# Patient Record
Sex: FEMALE | Race: WHITE | NOT HISPANIC OR LATINO | Employment: UNEMPLOYED | URBAN - METROPOLITAN AREA
[De-identification: names, ages, dates, MRNs, and addresses within clinical notes are randomized per-mention and may not be internally consistent; named-entity substitution may affect disease eponyms.]

---

## 2017-04-24 ENCOUNTER — HISTORICAL (OUTPATIENT)
Dept: LAB | Facility: HOSPITAL | Age: 47
End: 2017-04-24

## 2018-01-23 ENCOUNTER — HISTORICAL (OUTPATIENT)
Dept: RADIOLOGY | Facility: HOSPITAL | Age: 48
End: 2018-01-23

## 2018-01-31 ENCOUNTER — HISTORICAL (OUTPATIENT)
Dept: RADIOLOGY | Facility: HOSPITAL | Age: 48
End: 2018-01-31

## 2018-01-31 LAB — POC CREATININE: 0.9 MG/DL (ref 0.6–1.3)

## 2018-09-05 ENCOUNTER — HISTORICAL (OUTPATIENT)
Dept: LAB | Facility: HOSPITAL | Age: 48
End: 2018-09-05

## 2018-09-05 LAB
APTT PPP: 23.1 SECOND(S) (ref 21–30)
BUN SERPL-MCNC: 10 MG/DL (ref 7–18)
CALCIUM SERPL-MCNC: 8.8 MG/DL (ref 8.5–10.1)
CHLORIDE SERPL-SCNC: 103 MMOL/L (ref 98–107)
CO2 SERPL-SCNC: 33 MMOL/L (ref 21–32)
CREAT SERPL-MCNC: 0.95 MG/DL (ref 0.55–1.02)
CREAT/UREA NIT SERPL: 11
GLUCOSE SERPL-MCNC: 117 MG/DL (ref 74–106)
HCT VFR BLD AUTO: 40.2 % (ref 37–47)
HGB BLD-MCNC: 13.4 GM/DL (ref 12–16)
INR PPP: 1 (ref 2–3)
PLATELET # BLD AUTO: 256 X10(3)/MCL (ref 130–400)
POTASSIUM SERPL-SCNC: 4.3 MMOL/L (ref 3.5–5.1)
PROTHROMBIN TIME: 10.1 SECOND(S) (ref 9.3–11.4)
SODIUM SERPL-SCNC: 142 MMOL/L (ref 136–145)

## 2018-09-21 ENCOUNTER — HISTORICAL (OUTPATIENT)
Dept: ADMINISTRATIVE | Facility: HOSPITAL | Age: 48
End: 2018-09-21

## 2018-09-21 LAB — GROUP & RH: NORMAL

## 2019-02-15 ENCOUNTER — HISTORICAL (OUTPATIENT)
Dept: RADIOLOGY | Facility: HOSPITAL | Age: 49
End: 2019-02-15

## 2019-12-17 ENCOUNTER — HISTORICAL (OUTPATIENT)
Dept: RADIOLOGY | Facility: HOSPITAL | Age: 49
End: 2019-12-17

## 2020-02-13 ENCOUNTER — HISTORICAL (OUTPATIENT)
Dept: RADIOLOGY | Facility: HOSPITAL | Age: 50
End: 2020-02-13

## 2020-02-19 LAB
BUN SERPL-MCNC: 9 MG/DL (ref 7–18)
CALCIUM SERPL-MCNC: 9.3 MG/DL (ref 8.5–10.1)
CHLORIDE SERPL-SCNC: 101 MMOL/L (ref 98–107)
CO2 SERPL-SCNC: 30 MMOL/L (ref 21–32)
CREAT SERPL-MCNC: 0.86 MG/DL (ref 0.55–1.02)
CREAT/UREA NIT SERPL: 10
EST. AVERAGE GLUCOSE BLD GHB EST-MCNC: 157 MG/DL
GLUCOSE SERPL-MCNC: 143 MG/DL (ref 74–106)
HBA1C MFR BLD: 7.1 % (ref 4.2–6.3)
POTASSIUM SERPL-SCNC: 3.9 MMOL/L (ref 3.5–5.1)
SODIUM SERPL-SCNC: 137 MMOL/L (ref 136–145)

## 2020-03-02 ENCOUNTER — HISTORICAL (OUTPATIENT)
Dept: SURGERY | Facility: HOSPITAL | Age: 50
End: 2020-03-02

## 2021-01-08 ENCOUNTER — HISTORICAL (OUTPATIENT)
Dept: RADIOLOGY | Facility: HOSPITAL | Age: 51
End: 2021-01-08

## 2021-07-31 ENCOUNTER — HISTORICAL (OUTPATIENT)
Dept: INFECTIOUS DISEASES | Facility: HOSPITAL | Age: 51
End: 2021-07-31

## 2022-04-11 ENCOUNTER — HISTORICAL (OUTPATIENT)
Dept: ADMINISTRATIVE | Facility: HOSPITAL | Age: 52
End: 2022-04-11
Payer: COMMERCIAL

## 2022-04-24 VITALS
SYSTOLIC BLOOD PRESSURE: 148 MMHG | DIASTOLIC BLOOD PRESSURE: 103 MMHG | BODY MASS INDEX: 43.34 KG/M2 | WEIGHT: 260.13 LBS | HEIGHT: 65 IN

## 2022-04-28 ENCOUNTER — HISTORICAL (OUTPATIENT)
Dept: SURGERY | Facility: HOSPITAL | Age: 52
End: 2022-04-28
Payer: COMMERCIAL

## 2022-04-29 ENCOUNTER — HISTORICAL (OUTPATIENT)
Dept: SURGERY | Facility: HOSPITAL | Age: 52
End: 2022-04-29
Payer: COMMERCIAL

## 2022-04-29 LAB
ABS NEUT (OLG): 5.7 (ref 1.5–6.9)
ALBUMIN SERPL-MCNC: 3.5 G/DL (ref 3.5–5)
ALBUMIN/GLOB SERPL: 0.9 {RATIO} (ref 1.1–2)
ALP SERPL-CCNC: 122 U/L (ref 40–150)
ALT SERPL-CCNC: 24 U/L (ref 0–55)
APPEARANCE, UA: CLEAR
APTT PPP: 26 S (ref 23.4–34.9)
AST SERPL-CCNC: 21 U/L (ref 5–34)
BASOPHILS # BLD AUTO: 0 10*3/UL (ref 0–0.1)
BASOPHILS NFR BLD AUTO: 0 % (ref 0–1)
BILIRUB SERPL-MCNC: 0.3 MG/DL
BILIRUB UR QL STRIP: NEGATIVE
BILIRUBIN DIRECT+TOT PNL SERPL-MCNC: 0.1 (ref 0–0.5)
BILIRUBIN DIRECT+TOT PNL SERPL-MCNC: 0.2 (ref 0–0.8)
BUN SERPL-MCNC: 14 MG/DL (ref 9.8–20.1)
CALCIUM SERPL-MCNC: 9.7 MG/DL (ref 8.7–10.5)
CHLORIDE SERPL-SCNC: 99 MMOL/L (ref 98–107)
CO2 SERPL-SCNC: 31 MMOL/L (ref 22–29)
COLOR UR: YELLOW
CREAT SERPL-MCNC: 0.84 MG/DL (ref 0.55–1.02)
DO MICRO?: NO
EOSINOPHIL # BLD AUTO: 0.2 10*3/UL (ref 0–0.6)
EOSINOPHIL NFR BLD AUTO: 2 % (ref 0–5)
ERYTHROCYTE [DISTWIDTH] IN BLOOD BY AUTOMATED COUNT: 12.4 % (ref 11.5–17)
GLOBULIN SER-MCNC: 3.7 G/DL (ref 2.4–3.5)
GLUCOSE (UA): >=1000
GLUCOSE SERPL-MCNC: 226 MG/DL (ref 74–100)
HCT VFR BLD AUTO: 45.9 % (ref 36–48)
HEMOLYSIS INTERF INDEX SERPL-ACNC: 1
HGB BLD-MCNC: 15.5 G/DL (ref 12–16)
HGB UR QL STRIP: NEGATIVE
ICTERIC INTERF INDEX SERPL-ACNC: 0
IMM GRANULOCYTES # BLD AUTO: 0.03 10*3/UL (ref 0–0.02)
IMM GRANULOCYTES NFR BLD AUTO: 0.3 % (ref 0–0.43)
INR PPP: 1 (ref 2–3)
KETONES UR QL STRIP: NEGATIVE
LEUKOCYTE ESTERASE UR QL STRIP: NEGATIVE
LIPEMIC INTERF INDEX SERPL-ACNC: 0
LYMPHOCYTES # BLD AUTO: 2.4 10*3/UL (ref 0.5–4.1)
LYMPHOCYTES NFR BLD AUTO: 27 % (ref 15–40)
MANUAL DIFF? (OHS): NO
MCH RBC QN AUTO: 31 PG (ref 27–34)
MCHC RBC AUTO-ENTMCNC: 34 G/DL (ref 31–36)
MCV RBC AUTO: 93 FL (ref 80–99)
MONOCYTES # BLD AUTO: 0.4 10*3/UL (ref 0–1.1)
MONOCYTES NFR BLD AUTO: 5 % (ref 4–12)
NEUTROPHILS # BLD AUTO: 5.7 10*3/UL (ref 1.5–6.9)
NEUTROPHILS NFR BLD AUTO: 65 % (ref 43–75)
NITRITE UR QL STRIP: NEGATIVE
PH UR STRIP: 6 [PH] (ref 4.6–8)
PLATELET # BLD AUTO: 271 10*3/UL (ref 140–400)
PMV BLD AUTO: 10 FL (ref 6.8–10)
POTASSIUM SERPL-SCNC: 3.8 MMOL/L (ref 3.5–5.1)
PROT SERPL-MCNC: 7.2 G/DL (ref 6.4–8.3)
PROT UR QL STRIP: NEGATIVE
PROTHROMBIN TIME: 12.9 S (ref 11.7–14.5)
RBC # BLD AUTO: 4.94 10*6/UL (ref 4.2–5.4)
SODIUM SERPL-SCNC: 140 MMOL/L (ref 136–145)
SP GR UR STRIP: 1.01 (ref 1–1.03)
UROBILINOGEN UR STRIP-ACNC: 0.2
WBC # SPEC AUTO: 8.8 10*3/UL (ref 4.5–11.5)

## 2022-04-29 RX ORDER — LISINOPRIL 20 MG/1
20 TABLET ORAL EVERY MORNING
COMMUNITY

## 2022-04-29 RX ORDER — ERGOCALCIFEROL 1.25 MG/1
50000 CAPSULE ORAL
COMMUNITY

## 2022-04-29 RX ORDER — METFORMIN HYDROCHLORIDE 500 MG/1
500 TABLET ORAL 2 TIMES DAILY
COMMUNITY

## 2022-04-29 RX ORDER — LISDEXAMFETAMINE DIMESYLATE 40 MG/1
40 CAPSULE ORAL EVERY MORNING
COMMUNITY

## 2022-04-29 RX ORDER — DULOXETIN HYDROCHLORIDE 60 MG/1
60 CAPSULE, DELAYED RELEASE ORAL 2 TIMES DAILY
COMMUNITY

## 2022-04-29 RX ORDER — ICOSAPENT ETHYL 1000 MG/1
2 CAPSULE ORAL 2 TIMES DAILY
COMMUNITY

## 2022-04-29 RX ORDER — AMITRIPTYLINE HYDROCHLORIDE 75 MG/1
100 TABLET ORAL NIGHTLY
COMMUNITY

## 2022-04-29 RX ORDER — METHOCARBAMOL 750 MG/1
750 TABLET, FILM COATED ORAL 3 TIMES DAILY
COMMUNITY

## 2022-04-29 RX ORDER — EMPAGLIFLOZIN 25 MG/1
25 TABLET, FILM COATED ORAL EVERY MORNING
COMMUNITY

## 2022-04-29 RX ORDER — FUROSEMIDE 40 MG/1
40 TABLET ORAL EVERY MORNING
COMMUNITY

## 2022-04-29 RX ORDER — PROGESTERONE 100 MG/1
100 CAPSULE ORAL NIGHTLY
COMMUNITY

## 2022-04-29 RX ORDER — SPIRONOLACTONE 25 MG/1
25 TABLET ORAL EVERY MORNING
COMMUNITY

## 2022-04-29 RX ORDER — ALBUTEROL SULFATE 90 UG/1
2 AEROSOL, METERED RESPIRATORY (INHALATION) EVERY 4 HOURS PRN
COMMUNITY

## 2022-04-29 RX ORDER — ISOSORBIDE DINITRATE 30 MG/1
20 TABLET ORAL 3 TIMES DAILY
COMMUNITY

## 2022-04-29 RX ORDER — GABAPENTIN 600 MG/1
600 TABLET ORAL 3 TIMES DAILY
COMMUNITY

## 2022-04-29 RX ORDER — CARIPRAZINE 3 MG/1
3 CAPSULE, GELATIN COATED ORAL EVERY MORNING
COMMUNITY

## 2022-04-29 RX ORDER — OXYCODONE HYDROCHLORIDE 15 MG/1
15 TABLET ORAL 4 TIMES DAILY
COMMUNITY

## 2022-04-29 RX ORDER — ATORVASTATIN CALCIUM 10 MG/1
10 TABLET, FILM COATED ORAL EVERY MORNING
COMMUNITY

## 2022-04-29 RX ORDER — ISOSORBIDE MONONITRATE 30 MG/1
30 TABLET, EXTENDED RELEASE ORAL EVERY MORNING
COMMUNITY

## 2022-04-29 RX ORDER — POTASSIUM CHLORIDE 750 MG/1
10 CAPSULE, EXTENDED RELEASE ORAL EVERY MORNING
COMMUNITY

## 2022-04-29 RX ORDER — DULAGLUTIDE 1.5 MG/.5ML
1.5 INJECTION, SOLUTION SUBCUTANEOUS
COMMUNITY

## 2022-04-30 NOTE — OP NOTE
DATE OF SURGERY:    03/02/2020    SURGEON:  JAYNA Bates MD    PREOPERATIVE DIAGNOSIS:  Right posterior calcaneal osteophyte with Achilles tendinitis and peroneus brevis tendinitis with subluxation.    POSTOPERATIVE DIAGNOSIS:  Right posterior calcaneal osteophyte with Achilles tendinitis and peroneus brevis tendinitis with subluxation.    OPERATIVE PROCEDURES:    1. Excision of osteophyte right calcaneus with reattachment of Achilles tendon.  2. Exploration and repair of the peroneus brevis tendon with removal of excess muscle belly and correction of peroneal tendon subluxation.    INDICATION FOR OPERATION:  This 49-year-old had the above-mentioned problems.  She had failed conservative therapy and desired operative intervention.  The risks and benefits of the proposed and alternative treatment were explained to the patient.  Questions were solicited and answered. No assurance given. Informed consent was obtained.    OPERATION IN DETAIL:  After appropriate operative consents were obtained, the patient was taken to the operating room.  General endotracheal anesthesia was induced without difficulty.  The patient was rolled in a left lateral decubitus position on a bean bag.  Axillary roll was placed and the bean bag was inflated.  The patient was secured and all pressure points were padded.  The skin on the right leg was prepped and draped in a sterile manner using ChloraPrep.  After exsanguination with an Esmarch bandage, the previously placed thigh tourniquet was elevated.     A standard posterior approach to the Achilles tendon was done.  Careful dissection was done.  Tendon was identified and retractions were placed.  C-arm was brought in.  Osteophyte was identified.  The Achilles tendon was split along the small portion of its length and a small amount of Achilles tendon was taken off the calcaneal osteophyte, and this was removed with an osteotome.  This entire process was watched on AP and  lateral plane on image intensification.  Once I was satisfied with my resection, the wound was thoroughly irrigated.  Using a 2.9 mm JuggerKnot, a drill hole was made in the calcaneus.  The JuggerKnot was inserted and secured, and the Achilles tendon was secured back to the calcaneus.  Interrupted MaxBraid sutures were used to complete the repair.  The wound was thoroughly irrigated.  All bleeders were coagulated and the wound was repaired in a layered interrupted fashion with nylon sutures on the skin.     Next, attention was turned to the peroneal tendons.  A separate incision was made over the peroneal tendon sheath and this was opened.  The sural nerve was identified and protected.  The peroneal brevis tendon was found to sublux, had an accessory muscle belly, and was split in three pieces.  After I had cleared the excess muscle belly, I used Prolene suture to repair the peroneus brevis tendon in a tubularization-type fashion.  Once I was comfortable with my repair, the foot was taken through a range of motion.  It was found that more muscle belly needed to be removed, but at this point the peroneus brevis tendon became stable.  The sheath was repaired with interrupted Vicryl sutures and then the wound was thoroughly irrigated.  All bleeders were coagulated.  The wound was closed in a layered interrupted fashion with nylon sutures on the skin.  Sterile dressings were applied.  A compressive dressing was applied.  The tourniquet was deflated.  An extremely well-padded short-leg posterior splint was applied.     Lap count and sponge count were correct x2.  Estimated blood loss was 3 cc.  The patient tolerated the procedure without difficulty and was transferred to the recovery room in stable condition.        ______________________________  MTricia Bates MD    [facility]/  DD:  03/02/2020  Time:  09:24AM  DT:  03/02/2020  Time:  09:36AM  Job #:  [id number]

## 2022-05-06 ENCOUNTER — ANESTHESIA EVENT (OUTPATIENT)
Dept: SURGERY | Facility: HOSPITAL | Age: 52
End: 2022-05-06
Payer: COMMERCIAL

## 2022-05-07 NOTE — ANESTHESIA PREPROCEDURE EVALUATION
05/07/2022  Kathi Cleary is a 51 y.o., female.      Pre-op Assessment    I have reviewed the Patient Summary Reports.     I have reviewed the Nursing Notes. I have reviewed the NPO Status.      Review of Systems  Anesthesia Hx:  No problems with previous Anesthesia Denies Hx of Anesthetic complications    Social:  Non-Smoker, No Alcohol Use    Hematology/Oncology:  Hematology Normal   Oncology Normal     EENT/Dental:EENT/Dental Normal   Cardiovascular:   Hypertension    Pulmonary:  Pulmonary Normal    Renal/:  Renal/ Normal     Hepatic/GI:  Hepatic/GI Normal    Musculoskeletal:  Musculoskeletal Normal    Neurological:  Neurology Normal    Endocrine:   Diabetes    Dermatological:  Skin Normal    Psych:   Psychiatric History          Physical Exam  General: Cooperative, Alert and Oriented    Airway:  Mouth Opening: Normal  TM Distance: Normal  Tongue: Normal  Neck ROM: Normal ROM    Dental:  Intact        Anesthesia Plan  Type of Anesthesia, risks & benefits discussed:    Anesthesia Type: Gen Natural Airway  Intra-op Monitoring Plan: Standard ASA Monitors  Post Op Pain Control Plan:   (medical reason for not using multimodal pain management)  Informed Consent: Informed consent signed with the Patient and all parties understand the risks and agree with anesthesia plan.  All questions answered. Patient consented to blood products? Yes  ASA Score: 3  Anesthesia Plan Notes: 52 yo F sched for Colonoscopy  ASA III: HTN, DMII  Plan : IV GA    Ready For Surgery From Anesthesia Perspective.     .

## 2022-05-08 RX ORDER — DEXTROSE MONOHYDRATE AND SODIUM CHLORIDE 5; .45 G/100ML; G/100ML
INJECTION, SOLUTION INTRAVENOUS CONTINUOUS
Status: CANCELLED | OUTPATIENT
Start: 2022-05-08

## 2022-05-09 ENCOUNTER — ANESTHESIA (OUTPATIENT)
Dept: SURGERY | Facility: HOSPITAL | Age: 52
End: 2022-05-09
Payer: COMMERCIAL

## 2022-05-09 ENCOUNTER — HOSPITAL ENCOUNTER (OUTPATIENT)
Facility: HOSPITAL | Age: 52
Discharge: HOME | End: 2022-05-09
Attending: SURGERY | Admitting: SURGERY
Payer: COMMERCIAL

## 2022-05-09 VITALS
HEIGHT: 65 IN | BODY MASS INDEX: 43.32 KG/M2 | TEMPERATURE: 96 F | RESPIRATION RATE: 18 BRPM | SYSTOLIC BLOOD PRESSURE: 99 MMHG | DIASTOLIC BLOOD PRESSURE: 64 MMHG | WEIGHT: 260 LBS | HEART RATE: 92 BPM | OXYGEN SATURATION: 95 %

## 2022-05-09 DIAGNOSIS — Z12.11 COLON CANCER SCREENING: ICD-10-CM

## 2022-05-09 DIAGNOSIS — Z12.11 ENCOUNTER FOR COLORECTAL CANCER SCREENING: Primary | ICD-10-CM

## 2022-05-09 DIAGNOSIS — Z12.12 ENCOUNTER FOR COLORECTAL CANCER SCREENING: Primary | ICD-10-CM

## 2022-05-09 PROCEDURE — 37000008 HC ANESTHESIA 1ST 15 MINUTES: Performed by: SURGERY

## 2022-05-09 PROCEDURE — 63600175 PHARM REV CODE 636 W HCPCS: Performed by: NURSE ANESTHETIST, CERTIFIED REGISTERED

## 2022-05-09 PROCEDURE — 63600175 PHARM REV CODE 636 W HCPCS: Performed by: ANESTHESIOLOGY

## 2022-05-09 PROCEDURE — 45385 COLONOSCOPY W/LESION REMOVAL: CPT | Performed by: SURGERY

## 2022-05-09 PROCEDURE — C1773 RET DEV, INSERTABLE: HCPCS | Performed by: SURGERY

## 2022-05-09 PROCEDURE — 37000009 HC ANESTHESIA EA ADD 15 MINS: Performed by: SURGERY

## 2022-05-09 RX ORDER — FENTANYL CITRATE 50 UG/ML
INJECTION, SOLUTION INTRAMUSCULAR; INTRAVENOUS
Status: DISCONTINUED | OUTPATIENT
Start: 2022-05-09 | End: 2022-05-09

## 2022-05-09 RX ORDER — LIDOCAINE HYDROCHLORIDE 20 MG/ML
INJECTION INTRAVENOUS
Status: DISCONTINUED | OUTPATIENT
Start: 2022-05-09 | End: 2022-05-09

## 2022-05-09 RX ORDER — SODIUM CHLORIDE 9 MG/ML
INJECTION, SOLUTION INTRAVENOUS CONTINUOUS
Status: CANCELLED | OUTPATIENT
Start: 2022-05-09

## 2022-05-09 RX ORDER — PROPOFOL 10 MG/ML
VIAL (ML) INTRAVENOUS
Status: DISCONTINUED | OUTPATIENT
Start: 2022-05-09 | End: 2022-05-09

## 2022-05-09 RX ORDER — ONDANSETRON 2 MG/ML
INJECTION INTRAMUSCULAR; INTRAVENOUS
Status: DISCONTINUED | OUTPATIENT
Start: 2022-05-09 | End: 2022-05-09

## 2022-05-09 RX ORDER — SODIUM CHLORIDE, SODIUM LACTATE, POTASSIUM CHLORIDE, CALCIUM CHLORIDE 600; 310; 30; 20 MG/100ML; MG/100ML; MG/100ML; MG/100ML
INJECTION, SOLUTION INTRAVENOUS CONTINUOUS
Status: ACTIVE | OUTPATIENT
Start: 2022-05-09

## 2022-05-09 RX ADMIN — LIDOCAINE HYDROCHLORIDE 50 MG: 20 INJECTION INTRAVENOUS at 03:05

## 2022-05-09 RX ADMIN — FENTANYL CITRATE 100 MCG: 50 INJECTION, SOLUTION INTRAMUSCULAR; INTRAVENOUS at 03:05

## 2022-05-09 RX ADMIN — PROPOFOL 50 MG: 10 INJECTION, EMULSION INTRAVENOUS at 04:05

## 2022-05-09 RX ADMIN — ONDANSETRON 4 MG: 2 INJECTION INTRAMUSCULAR; INTRAVENOUS at 03:05

## 2022-05-09 RX ADMIN — PROPOFOL 50 MG: 10 INJECTION, EMULSION INTRAVENOUS at 03:05

## 2022-05-09 RX ADMIN — PROPOFOL 100 MG: 10 INJECTION, EMULSION INTRAVENOUS at 03:05

## 2022-05-09 RX ADMIN — SODIUM CHLORIDE, POTASSIUM CHLORIDE, SODIUM LACTATE AND CALCIUM CHLORIDE: 600; 310; 30; 20 INJECTION, SOLUTION INTRAVENOUS at 01:05

## 2022-05-09 RX ADMIN — PROPOFOL 100 MG: 10 INJECTION, EMULSION INTRAVENOUS at 04:05

## 2022-05-09 NOTE — OP NOTE
[facility] - Periop Services  Operative Note    SDate of Procedure: 5/9/2022     Procedure: Procedure(s) (LRB):  COLONOSCOPY (N/A)   1. Polypectomy x2 in the rectum with a hot loop snare  Surgeon(s) and Role:     * Hans Roth MD - Primary    Assisting Surgeon: None    Pre-Operative Diagnosis: Encounter for colorectal cancer screening [Z12.11, Z12.12]  1. Need for age-appropriate screening colonoscopy  2. No blood in the stool  3. Symptomatic hemorrhoids  4. Last colonoscopy over 15 years ago  Post-Operative Diagnosis: Post-Op Diagnosis Codes:     * Encounter for colorectal cancer screening [Z12.11, Z12.12]    1.  Poor prep in the cecum without intubation of the ileocecal valve due to solid stool  2. Normal ascending transverse and descending colon  3. Diverticulosis of the rectosigmoid colon with stricture navigable with the scope  4. Polyps in the rectum x2 removed with hot loop snare     Anesthesia: General    Operative Findings (including complications, if any):  Patient is a 51-year-old  female with a history of hypertension hypercholesterolemia anxiety depressive disorders had a stroke in 2009 right hemispheric with left-sided weakness and COPD.  Patient smokes about 1/2 pack a day for 50 years quit in 2009. She has obstructive sleep apnea and has been off CPAP now on medication.  She had a previous heart attack in 2009 and 3 vessel bypass and now presents with no blood in the stool but definite hemorrhoids need an age-appropriate screening colonoscopy.  She has a family colon cancer and her last colonoscopy was over 15 years ago.    Patient underwent colonoscopy under sedation with a flexible Olympus scope to the cecum of the cecum.  The cecum itself was with solid stool making visibility difficult the ascending transverse and descending colon were normal the patient did have a diverticular stricture in the rectosigmoid area that was navigable but it required some time to get past  no mass.  No mass lesions or tumors were noted patient did have 2 polyps in the rectum that were removed with a hot loop snare, clear margin obtained appear to be consistent with benign adenomas.  And    Blood Loss (EBL): * No values recorded between 5/9/2022  3:50 PM and 5/9/2022  4:19 PM *           Implants: * No implants in log *    Specimens:   Specimen (24h ago, onward)             Start     Ordered    05/09/22 1614  Specimen to Pathology Gastrointestinal tract  Once        Comments: 1. Polypectomy in rectum     References:    Click here for ordering Quick Tip   Question Answer Comment   Procedure Type: Gastrointestinal tract    Specimen Source Anus    Specimen Class: Routine/Screening    Clinical Information: Screening    Release to patient Immediate        05/09/22 1614                        Condition: Good    Disposition: PACU - hemodynamically stable.    Attestation: I was present and scrubbed for the entire procedure.      Discharge Note    OUTCOME: Patient tolerated treatment/procedure well without complication and is now ready for discharge.    DISPOSITION: Home or Self Care    FINAL DIAGNOSIS:  <principal problem not specified>    FOLLOWUP: In clinic    DISCHARGE INSTRUCTIONS:  No discharge procedures on file.  Thank

## 2022-05-09 NOTE — ANESTHESIA POSTPROCEDURE EVALUATION
Anesthesia Post Evaluation    Patient: Kathi Cleary    Procedure(s) Performed: Procedure(s) (LRB):  COLONOSCOPY (N/A)    Final Anesthesia Type: MAC      Patient location during evaluation: OPS  Patient participation: Yes- Able to Participate  Level of consciousness: awake and alert and oriented  Post-procedure vital signs: reviewed and stable  Pain management: adequate  Airway patency: patent    PONV status at discharge: No PONV  Anesthetic complications: no      Cardiovascular status: blood pressure returned to baseline and stable  Respiratory status: unassisted, spontaneous ventilation and room air  Hydration status: euvolemic  Follow-up not needed.  Comments: Patient to bed per self              No case tracking events are documented in the log.      Pain/Laxmi Score: No data recorded

## 2022-05-13 LAB
ESTROGEN SERPL-MCNC: NORMAL PG/ML
INSULIN SERPL-ACNC: NORMAL U[IU]/ML
LAB AP CLINICAL INFORMATION: NORMAL
LAB AP GROSS DESCRIPTION: NORMAL
LAB AP REPORT FOOTNOTES: NORMAL
T3RU NFR SERPL: NORMAL %

## 2022-05-18 ENCOUNTER — HOSPITAL ENCOUNTER (OUTPATIENT)
Dept: RADIOLOGY | Facility: HOSPITAL | Age: 52
Discharge: HOME | End: 2022-05-18
Attending: SURGERY
Payer: COMMERCIAL

## 2022-05-18 DIAGNOSIS — Z12.31 BREAST CANCER SCREENING BY MAMMOGRAM: ICD-10-CM

## 2022-05-18 PROCEDURE — 77067 SCR MAMMO BI INCL CAD: CPT | Mod: TC

## 2022-05-18 PROCEDURE — 77067 MAMMO DIGITAL SCREENING BILAT WITH TOMO: ICD-10-PCS | Mod: 26,,, | Performed by: RADIOLOGY

## 2022-05-18 PROCEDURE — 77067 SCR MAMMO BI INCL CAD: CPT | Mod: 26,,, | Performed by: RADIOLOGY

## 2022-05-18 PROCEDURE — 77063 BREAST TOMOSYNTHESIS BI: CPT | Mod: 26,,, | Performed by: RADIOLOGY

## 2022-05-18 PROCEDURE — 77063 MAMMO DIGITAL SCREENING BILAT WITH TOMO: ICD-10-PCS | Mod: 26,,, | Performed by: RADIOLOGY

## 2022-05-31 ENCOUNTER — HOSPITAL ENCOUNTER (OUTPATIENT)
Dept: RADIOLOGY | Facility: HOSPITAL | Age: 52
Discharge: HOME | End: 2022-05-31
Attending: SURGERY
Payer: COMMERCIAL

## 2022-05-31 ENCOUNTER — LAB VISIT (OUTPATIENT)
Dept: LAB | Facility: HOSPITAL | Age: 52
End: 2022-05-31
Attending: SURGERY
Payer: COMMERCIAL

## 2022-05-31 ENCOUNTER — CLINICAL SUPPORT (OUTPATIENT)
Dept: RESPIRATORY THERAPY | Facility: HOSPITAL | Age: 52
End: 2022-05-31
Attending: SURGERY
Payer: COMMERCIAL

## 2022-05-31 DIAGNOSIS — Z01.818 PRE-OP EVALUATION: Primary | ICD-10-CM

## 2022-05-31 DIAGNOSIS — K64.9 HEMORRHOIDS WITHOUT COMPLICATION: Primary | ICD-10-CM

## 2022-05-31 DIAGNOSIS — Z01.818 PRE-OP EVALUATION: ICD-10-CM

## 2022-05-31 DIAGNOSIS — Z01.818 PRE-OP EXAM: ICD-10-CM

## 2022-05-31 LAB
ALBUMIN SERPL-MCNC: 3.7 GM/DL (ref 3.5–5)
ALBUMIN/GLOB SERPL: 1.2 RATIO (ref 1.1–2)
ALP SERPL-CCNC: 143 UNIT/L (ref 40–150)
ALT SERPL-CCNC: 25 UNIT/L (ref 0–55)
APPEARANCE UR: CLEAR
APTT PPP: 25.8 SECONDS (ref 23.2–33.7)
AST SERPL-CCNC: 24 UNIT/L (ref 5–34)
BASOPHILS # BLD AUTO: 0.03 X10(3)/MCL (ref 0–0.2)
BASOPHILS NFR BLD AUTO: 0.3 %
BILIRUB UR QL STRIP.AUTO: NEGATIVE MG/DL
BILIRUBIN DIRECT+TOT PNL SERPL-MCNC: 0.5 MG/DL
BUN SERPL-MCNC: 9 MG/DL (ref 9.8–20.1)
CALCIUM SERPL-MCNC: 9.6 MG/DL (ref 8.4–10.2)
CHLORIDE SERPL-SCNC: 97 MMOL/L (ref 98–107)
CO2 SERPL-SCNC: 32 MMOL/L (ref 22–29)
COLOR UR AUTO: YELLOW
CREAT SERPL-MCNC: 0.93 MG/DL (ref 0.55–1.02)
EOSINOPHIL # BLD AUTO: 0.15 X10(3)/MCL (ref 0–0.9)
EOSINOPHIL NFR BLD AUTO: 1.7 %
ERYTHROCYTE [DISTWIDTH] IN BLOOD BY AUTOMATED COUNT: 12.4 % (ref 11.5–17)
GLOBULIN SER-MCNC: 3.2 GM/DL (ref 2.4–3.5)
GLUCOSE SERPL-MCNC: 163 MG/DL (ref 74–100)
GLUCOSE UR QL STRIP.AUTO: >=1000 MG/DL
HCT VFR BLD AUTO: 47.8 % (ref 37–47)
HGB BLD-MCNC: 15.9 GM/DL (ref 12–16)
IMM GRANULOCYTES # BLD AUTO: 0.02 X10(3)/MCL (ref 0–0.02)
IMM GRANULOCYTES NFR BLD AUTO: 0.2 % (ref 0–0.43)
INR BLD: 0.96 (ref 0–1.3)
KETONES UR QL STRIP.AUTO: NEGATIVE MG/DL
LEUKOCYTE ESTERASE UR QL STRIP.AUTO: NEGATIVE UNIT/L
LYMPHOCYTES # BLD AUTO: 2.92 X10(3)/MCL (ref 0.6–4.6)
LYMPHOCYTES NFR BLD AUTO: 33.4 %
MCH RBC QN AUTO: 31.1 PG (ref 27–31)
MCHC RBC AUTO-ENTMCNC: 33.3 MG/DL (ref 33–36)
MCV RBC AUTO: 93.5 FL (ref 80–94)
MONOCYTES # BLD AUTO: 0.43 X10(3)/MCL (ref 0.1–1.3)
MONOCYTES NFR BLD AUTO: 4.9 %
NEUTROPHILS # BLD AUTO: 5.2 X10(3)/MCL (ref 2.1–9.2)
NEUTROPHILS NFR BLD AUTO: 59.5 %
NITRITE UR QL STRIP.AUTO: NEGATIVE
PH UR STRIP.AUTO: 5 [PH]
PHOSPHATE SERPL-MCNC: 4.8 MG/DL (ref 2.3–4.7)
PLATELET # BLD AUTO: 271 X10(3)/MCL (ref 130–400)
PMV BLD AUTO: 10.4 FL (ref 9.4–12.4)
POTASSIUM SERPL-SCNC: 4.1 MMOL/L (ref 3.5–5.1)
PROT SERPL-MCNC: 6.9 GM/DL (ref 6.4–8.3)
PROT UR QL STRIP.AUTO: NEGATIVE MG/DL
PROTHROMBIN TIME: 12.7 SECONDS (ref 12.5–14.5)
RBC # BLD AUTO: 5.11 X10(6)/MCL (ref 4.2–5.4)
RBC UR QL AUTO: NEGATIVE UNIT/L
SODIUM SERPL-SCNC: 138 MMOL/L (ref 136–145)
SP GR UR STRIP.AUTO: 1.01
UROBILINOGEN UR STRIP-ACNC: 0.2 MG/DL
WBC # SPEC AUTO: 8.7 X10(3)/MCL (ref 4.5–11.5)

## 2022-05-31 PROCEDURE — 81003 URINALYSIS AUTO W/O SCOPE: CPT

## 2022-05-31 PROCEDURE — 80053 COMPREHEN METABOLIC PANEL: CPT

## 2022-05-31 PROCEDURE — 93005 ELECTROCARDIOGRAM TRACING: CPT

## 2022-05-31 PROCEDURE — 84100 ASSAY OF PHOSPHORUS: CPT

## 2022-05-31 PROCEDURE — 85730 THROMBOPLASTIN TIME PARTIAL: CPT

## 2022-05-31 PROCEDURE — 71046 X-RAY EXAM CHEST 2 VIEWS: CPT | Mod: TC

## 2022-05-31 PROCEDURE — 36415 COLL VENOUS BLD VENIPUNCTURE: CPT

## 2022-05-31 PROCEDURE — 85025 COMPLETE CBC W/AUTO DIFF WBC: CPT

## 2022-05-31 PROCEDURE — 85610 PROTHROMBIN TIME: CPT

## 2022-05-31 RX ORDER — ASPIRIN 325 MG
50000 TABLET, DELAYED RELEASE (ENTERIC COATED) ORAL
COMMUNITY
Start: 2022-05-27 | End: 2022-05-31 | Stop reason: CLARIF

## 2022-05-31 NOTE — DISCHARGE INSTRUCTIONS
Follow prep on Wednesday. Use CHG wipes as directed. Nothing to drink after midnight. Take Isosorbide AM of procedure with sip of water.     DISCHARGE INSTRUCTIONS:    KEEP AREA CLEAN WITH SOAP AND WATER AFTER EVERY BOWEL MOVEMENT, MAY SOAK IN WARM EPSOM SALT BATH AS NEEDED, STAY ON LIQUID DIET FOR 7 DAYS, ONCE PACKING FALLS OUT WITH 1ST BOWEL MOVEMENT, DO NOT REPACK. WEAR JORGE L PAD AS NEEDED FOR MINIMAL BLEEDING

## 2022-06-02 ENCOUNTER — ANESTHESIA EVENT (OUTPATIENT)
Dept: SURGERY | Facility: HOSPITAL | Age: 52
End: 2022-06-02
Payer: COMMERCIAL

## 2022-06-02 ENCOUNTER — ANESTHESIA (OUTPATIENT)
Dept: SURGERY | Facility: HOSPITAL | Age: 52
End: 2022-06-02
Payer: COMMERCIAL

## 2022-06-02 ENCOUNTER — HOSPITAL ENCOUNTER (OUTPATIENT)
Facility: HOSPITAL | Age: 52
Discharge: HOME | End: 2022-06-02
Attending: SURGERY | Admitting: SURGERY
Payer: COMMERCIAL

## 2022-06-02 DIAGNOSIS — K64.9 HEMORRHOIDS, UNSPECIFIED HEMORRHOID TYPE: ICD-10-CM

## 2022-06-02 DIAGNOSIS — K64.9 HEMORRHOIDS: Primary | ICD-10-CM

## 2022-06-02 LAB
POCT GLUCOSE: 167 MG/DL (ref 70–110)
POCT GLUCOSE: 192 MG/DL (ref 70–110)

## 2022-06-02 PROCEDURE — 71000015 HC POSTOP RECOV 1ST HR: Performed by: SURGERY

## 2022-06-02 PROCEDURE — 25000003 PHARM REV CODE 250: Performed by: SURGERY

## 2022-06-02 PROCEDURE — 25000003 PHARM REV CODE 250: Performed by: NURSE ANESTHETIST, CERTIFIED REGISTERED

## 2022-06-02 PROCEDURE — 63600175 PHARM REV CODE 636 W HCPCS: Performed by: NURSE ANESTHETIST, CERTIFIED REGISTERED

## 2022-06-02 PROCEDURE — 63600175 PHARM REV CODE 636 W HCPCS: Performed by: ANESTHESIOLOGY

## 2022-06-02 PROCEDURE — 36000706: Performed by: SURGERY

## 2022-06-02 PROCEDURE — 36000707: Performed by: SURGERY

## 2022-06-02 PROCEDURE — 27201423 OPTIME MED/SURG SUP & DEVICES STERILE SUPPLY: Performed by: SURGERY

## 2022-06-02 PROCEDURE — 37000008 HC ANESTHESIA 1ST 15 MINUTES: Performed by: SURGERY

## 2022-06-02 PROCEDURE — 37000009 HC ANESTHESIA EA ADD 15 MINS: Performed by: SURGERY

## 2022-06-02 PROCEDURE — 71000033 HC RECOVERY, INTIAL HOUR: Performed by: SURGERY

## 2022-06-02 RX ORDER — DIPHENHYDRAMINE HYDROCHLORIDE 50 MG/ML
25 INJECTION INTRAMUSCULAR; INTRAVENOUS EVERY 6 HOURS PRN
Status: DISCONTINUED | OUTPATIENT
Start: 2022-06-02 | End: 2022-06-02 | Stop reason: HOSPADM

## 2022-06-02 RX ORDER — SODIUM CHLORIDE 9 MG/ML
INJECTION, SOLUTION INTRAVENOUS CONTINUOUS
Status: CANCELLED | OUTPATIENT
Start: 2022-06-02

## 2022-06-02 RX ORDER — ONDANSETRON 2 MG/ML
INJECTION INTRAMUSCULAR; INTRAVENOUS
Status: DISCONTINUED | OUTPATIENT
Start: 2022-06-02 | End: 2022-06-02

## 2022-06-02 RX ORDER — HYDROCODONE BITARTRATE AND ACETAMINOPHEN 5; 325 MG/1; MG/1
2 TABLET ORAL EVERY 6 HOURS PRN
Status: DISCONTINUED | OUTPATIENT
Start: 2022-06-02 | End: 2022-06-02 | Stop reason: HOSPADM

## 2022-06-02 RX ORDER — DEXAMETHASONE SODIUM PHOSPHATE 4 MG/ML
INJECTION, SOLUTION INTRA-ARTICULAR; INTRALESIONAL; INTRAMUSCULAR; INTRAVENOUS; SOFT TISSUE
Status: DISCONTINUED | OUTPATIENT
Start: 2022-06-02 | End: 2022-06-02

## 2022-06-02 RX ORDER — CEFAZOLIN SODIUM 2 G/50ML
2 SOLUTION INTRAVENOUS
Status: DISCONTINUED | OUTPATIENT
Start: 2022-06-02 | End: 2022-06-02 | Stop reason: HOSPADM

## 2022-06-02 RX ORDER — MIDAZOLAM HYDROCHLORIDE 1 MG/ML
INJECTION INTRAMUSCULAR; INTRAVENOUS
Status: DISCONTINUED | OUTPATIENT
Start: 2022-06-02 | End: 2022-06-02

## 2022-06-02 RX ORDER — SODIUM CHLORIDE, SODIUM LACTATE, POTASSIUM CHLORIDE, CALCIUM CHLORIDE 600; 310; 30; 20 MG/100ML; MG/100ML; MG/100ML; MG/100ML
INJECTION, SOLUTION INTRAVENOUS CONTINUOUS
Status: DISCONTINUED | OUTPATIENT
Start: 2022-06-02 | End: 2022-06-02 | Stop reason: HOSPADM

## 2022-06-02 RX ORDER — HYDROMORPHONE HYDROCHLORIDE 2 MG/ML
0.2 INJECTION, SOLUTION INTRAMUSCULAR; INTRAVENOUS; SUBCUTANEOUS EVERY 5 MIN PRN
Status: DISCONTINUED | OUTPATIENT
Start: 2022-06-02 | End: 2022-06-02 | Stop reason: HOSPADM

## 2022-06-02 RX ORDER — ONDANSETRON 2 MG/ML
4 INJECTION INTRAMUSCULAR; INTRAVENOUS DAILY PRN
Status: DISCONTINUED | OUTPATIENT
Start: 2022-06-02 | End: 2022-06-02 | Stop reason: HOSPADM

## 2022-06-02 RX ORDER — SODIUM CHLORIDE 0.9 % (FLUSH) 0.9 %
3 SYRINGE (ML) INJECTION
Status: DISCONTINUED | OUTPATIENT
Start: 2022-06-02 | End: 2022-06-02 | Stop reason: HOSPADM

## 2022-06-02 RX ORDER — KETOROLAC TROMETHAMINE 30 MG/ML
15 INJECTION, SOLUTION INTRAMUSCULAR; INTRAVENOUS EVERY 8 HOURS PRN
Status: DISCONTINUED | OUTPATIENT
Start: 2022-06-02 | End: 2022-06-02 | Stop reason: HOSPADM

## 2022-06-02 RX ORDER — SODIUM CHLORIDE 0.9 % (FLUSH) 0.9 %
10 SYRINGE (ML) INJECTION
Status: DISCONTINUED | OUTPATIENT
Start: 2022-06-02 | End: 2022-06-02 | Stop reason: HOSPADM

## 2022-06-02 RX ORDER — HYDRALAZINE HYDROCHLORIDE 20 MG/ML
10 INJECTION INTRAMUSCULAR; INTRAVENOUS EVERY 6 HOURS PRN
Status: DISCONTINUED | OUTPATIENT
Start: 2022-06-02 | End: 2022-06-02 | Stop reason: HOSPADM

## 2022-06-02 RX ORDER — BUPIVACAINE HYDROCHLORIDE 5 MG/ML
INJECTION, SOLUTION EPIDURAL; INTRACAUDAL
Status: DISCONTINUED | OUTPATIENT
Start: 2022-06-02 | End: 2022-06-02 | Stop reason: HOSPADM

## 2022-06-02 RX ORDER — PROPOFOL 10 MG/ML
VIAL (ML) INTRAVENOUS
Status: DISCONTINUED | OUTPATIENT
Start: 2022-06-02 | End: 2022-06-02

## 2022-06-02 RX ORDER — FENTANYL CITRATE 50 UG/ML
INJECTION, SOLUTION INTRAMUSCULAR; INTRAVENOUS
Status: DISCONTINUED | OUTPATIENT
Start: 2022-06-02 | End: 2022-06-02

## 2022-06-02 RX ORDER — LABETALOL HYDROCHLORIDE 5 MG/ML
INJECTION, SOLUTION INTRAVENOUS
Status: DISCONTINUED | OUTPATIENT
Start: 2022-06-02 | End: 2022-06-02

## 2022-06-02 RX ORDER — ROCURONIUM BROMIDE 10 MG/ML
INJECTION, SOLUTION INTRAVENOUS
Status: DISCONTINUED | OUTPATIENT
Start: 2022-06-02 | End: 2022-06-02

## 2022-06-02 RX ADMIN — SODIUM CHLORIDE, POTASSIUM CHLORIDE, SODIUM LACTATE AND CALCIUM CHLORIDE: 600; 310; 30; 20 INJECTION, SOLUTION INTRAVENOUS at 11:06

## 2022-06-02 RX ADMIN — DEXTROSE MONOHYDRATE 2 G: 5 INJECTION, SOLUTION INTRAVENOUS at 11:06

## 2022-06-02 RX ADMIN — LABETALOL HYDROCHLORIDE 10 MG: 5 INJECTION INTRAVENOUS at 12:06

## 2022-06-02 RX ADMIN — DEXAMETHASONE SODIUM PHOSPHATE 8 MG: 4 INJECTION, SOLUTION INTRA-ARTICULAR; INTRALESIONAL; INTRAMUSCULAR; INTRAVENOUS; SOFT TISSUE at 12:06

## 2022-06-02 RX ADMIN — ONDANSETRON 4 MG: 2 INJECTION INTRAMUSCULAR; INTRAVENOUS at 12:06

## 2022-06-02 RX ADMIN — SODIUM CHLORIDE, POTASSIUM CHLORIDE, SODIUM LACTATE AND CALCIUM CHLORIDE: 600; 310; 30; 20 INJECTION, SOLUTION INTRAVENOUS at 10:06

## 2022-06-02 RX ADMIN — MIDAZOLAM HYDROCHLORIDE 2 MG: 1 INJECTION, SOLUTION INTRAMUSCULAR; INTRAVENOUS at 11:06

## 2022-06-02 RX ADMIN — SODIUM CHLORIDE, POTASSIUM CHLORIDE, SODIUM LACTATE AND CALCIUM CHLORIDE: 600; 310; 30; 20 INJECTION, SOLUTION INTRAVENOUS at 09:06

## 2022-06-02 RX ADMIN — FENTANYL CITRATE 50 MCG: 50 INJECTION INTRAMUSCULAR; INTRAVENOUS at 11:06

## 2022-06-02 RX ADMIN — ROCURONIUM BROMIDE 50 MG: 10 INJECTION, SOLUTION INTRAVENOUS at 11:06

## 2022-06-02 RX ADMIN — PROPOFOL 150 MG: 10 INJECTION, EMULSION INTRAVENOUS at 11:06

## 2022-06-02 RX ADMIN — HYDRALAZINE HYDROCHLORIDE 10 MG: 20 INJECTION INTRAMUSCULAR; INTRAVENOUS at 01:06

## 2022-06-02 NOTE — ANESTHESIA POSTPROCEDURE EVALUATION
Anesthesia Post Evaluation    Patient: Kathi Cleary    Procedure(s) Performed: Procedure(s) (LRB):  HEMORRHOIDECTOMY / PPH (N/A)  EXCISION, CONDYLOMA. (N/A)    Final Anesthesia Type: general      Patient location during evaluation: PACU  Patient participation: Yes- Able to Participate  Level of consciousness: awake and alert  Post-procedure vital signs: reviewed and stable  Pain management: adequate  Airway patency: patent  BETTIE mitigation strategies: Multimodal analgesia, Verification of full reversal of neuromuscular block and Extubation and recovery carried out in lateral, semiupright, or other nonsupine position  PONV status at discharge: No PONV  Anesthetic complications: no      Cardiovascular status: hemodynamically stable  Respiratory status: unassisted, spontaneous ventilation and room air  Hydration status: euvolemic  Follow-up not needed.          Vitals Value Taken Time   /89 06/02/22 1331   Temp 36.1 °C (97 °F) 06/02/22 1255   Pulse 91 06/02/22 1332   Resp 25 06/02/22 1333   SpO2 99 % 06/02/22 1332   Vitals shown include unvalidated device data.      Event Time   Out of Recovery 13:36:33         Pain/Laxmi Score: Laxmi Score: 8 (6/2/2022  1:05 PM)

## 2022-06-02 NOTE — DISCHARGE SUMMARY
[facility] - Periop Services  Discharge Note  Short Stay    Procedure(s) (LRB):  HEMORRHOIDECTOMY / PPH (N/A)    OUTCOME: Patient tolerated treatment/procedure well without complication and is now ready for discharge.    DISPOSITION: Home or Self Care    FINAL DIAGNOSIS:  Hemorrhoids    FOLLOWUP: In clinic    DISCHARGE INSTRUCTIONS:    Discharge Procedure Orders   Diet general         Clinical Reference Documents Added to Patient Instructions       Document    HEMORRHOIDECTOMY DISCHARGE INSTRUCTIONS (ENGLISH)          TIME SPENT ON DISCHARGE: 5 minutes

## 2022-06-02 NOTE — ANESTHESIA PREPROCEDURE EVALUATION
06/02/2022  Kathi Cleary is a 51 y.o., female.      Pre-op Assessment    I have reviewed the Patient Summary Reports.     I have reviewed the Nursing Notes. I have reviewed the NPO Status.   I have reviewed the Medications.     Review of Systems  Anesthesia Hx:  History of prior surgery of interest to airway management or planning: heart surgery. Previous anesthesia: General Denies Family Hx of Anesthesia complications.   Denies Personal Hx of Anesthesia complications.   Social:  Non-Smoker, No Alcohol Use    Hematology/Oncology:  Hematology Normal   Oncology Normal     EENT/Dental:EENT/Dental Normal   Cardiovascular:   Hypertension CAD asymptomatic CABG/stent  ECG has been reviewed.    Pulmonary:  Pulmonary Normal    Renal/:  Renal/ Normal     Hepatic/GI:  Hepatic/GI Normal    Musculoskeletal:  Musculoskeletal Normal    Neurological:  Neurology Normal    Endocrine:   Diabetes, well controlled, type 2    Dermatological:  Skin Normal    Psych:   Psychiatric History          Physical Exam  General: Cooperative, Alert and Oriented    Airway:  Mallampati: II   Mouth Opening: Normal  TM Distance: Normal  Neck ROM: Normal ROM    Dental:  Intact        Anesthesia Plan  Type of Anesthesia, risks & benefits discussed:    Anesthesia Type: Spinal, Gen ETT  Intra-op Monitoring Plan: Standard ASA Monitors  Post Op Pain Control Plan: multimodal analgesia  Induction:  IV  Informed Consent: Informed consent signed with the Patient and all parties understand the risks and agree with anesthesia plan.  All questions answered. Patient consented to blood products? Yes  ASA Score: 3  Anesthesia Plan Notes: 50 yo F sched for Hemorrhoidectomy  ASA III: s/p CABG, DMII, Obesity, h/o chronic back pain with previous back surgery.  Plan: GEN    Ready For Surgery From Anesthesia Perspective.     .

## 2022-06-02 NOTE — PATIENT INSTRUCTIONS
A liquid diet x1 week  Wash after every bowel movement  Advil Tylenol for pain  Norco for severe pain

## 2022-06-02 NOTE — OP NOTE
[facility]                     [address] [address]    PATIENT NAME:      LANDON RIGGINS  YOB: 1970  CSN:               [id number]  MRN:               [id number]  ADMIT DATE:        06/02/2022 08:28:00  PHYSICIAN:         Hans Roth MD                          OPERATIVE REPORT      DATE OF SURGERY:        SURGEON:  Hans Roth MD    ADMITTING DIAGNOSIS:  Symptomatic hemorrhoids with anal condylomas along the   right and left and anterior anorectal skin.    PROCEDURES:    1. PPH stapling of internal hemorrhoids.  2. Excision of anal condylomas along the    gluteus.  3. Excision of anal condylomas on the    gluteus.    BRIEF HISTORY:  Patient is a 51-year-old  female, morbidly obese, with   hypertension, hypercholesterolemia, anxiety/depressive disorders.  She had a   stroke in 2009, right hemispheric with left-sided weakness.  Has severe COPD and   smoked about 1-1/2 packs a day for 50 years, quit in 2009.  Patient does have   obstructive sleep apnea and is off CPAP, now on medication.  Patient had a heart   attack in 2009, status post 3-vessel bypass.  Patient had blood in the stool   and hemorrhoids noted.  Had a colonoscopy done that was consistent with a polyp   in the rectum and 2 hyperplastic polyps, as well as pain and tenderness   consistent with hemorrhoids and anal condyloma.    DESCRIPTION OF PROCEDURE:  Patient was brought to the OR and had attempted   spinal but was unsuccessful.  As a result, underwent general anesthetic.  Prone   mckayla-knife positioning.  Had an anal dilator inserted with exposure of the   anorectum, and after being prepped and draped in a sterile fashion, patient then   underwent placement of an anal dilator and pursestring suturing of the   anorectal mucosa with a 2-0 Prolene.  Using an anal dilator and anal probe,   patient did have a mucosal proctectomy  circumferentially done, and it was marked   anteriorly with a single stitch and to the left with double stitch.  Patient   had excision of the anal condylomas on the left and right gluteal cheeks with a   Bovie cautery.  The skin and soft tissue were closed with 2-0 Vicryl.  The anal   condyloma to the    right perianal region was marked at 12 o'clock posterior   and 9 o'clock medial.     The left anal condyloma was excised and sent to   Pathology.  Patient had an anal condylomas anteriorly that will need to be   excised at a later date.  I could not take them all in the same sitting; it   would have been too much for the patient to be able to handle.  Patient did have   marking of the mucosal proctectomy with a single stitch on the 6 o'clock   anterior and double stitch on the 9 o'clock left position to make sure that the   specimen did not have anything within it that might be related to HPV virus.    Overall, the patient did well, had no problems or difficulties.  Was awakened   and sent to recovery in good condition.    I appreciate the consultation referral from her nurse practitioner, Mr. Aries Hanson, and will notify him of my findings.        ______________________________  MD SEAN Johnson/LISSETH  DD:  06/02/2022  Time:  12:36PM  DT:  06/02/2022  Time:  01:09PM  Job #:  [id number]    cc:   PINKY Jaimes        OPERATIVE REPORT

## 2022-06-02 NOTE — ANESTHESIA PROCEDURE NOTES
Intubation    Date/Time: 6/2/2022 11:46 AM  Performed by: Ori Geiger CRNA  Authorized by: Daniel Torres DO     Intubation:     Induction:  Intravenous    Intubated:  Postinduction    Mask Ventilation:  Easy mask    Attempts:  1    Attempted By:  CRNA    Method of Intubation:  Direct    Blade:  Chandler 2    Laryngeal View Grade: Grade I - full view of cords      Difficult Airway Encountered?: No      Complications:  None    Airway Device:  Oral endotracheal tube    Airway Device Size:  7.5    Style/Cuff Inflation:  Cuffed (inflated to minimal occlusive pressure)    Inflation Amount (mL):  7    Tube secured:  22    Secured at:  The lips    Placement Verified By:  Capnometry and Colorimetric ETCO2 device    Complicating Factors:  None    Findings Post-Intubation:  BS equal bilateral and atraumatic/condition of teeth unchanged

## 2022-06-02 NOTE — TRANSFER OF CARE
Anesthesia Transfer of Care Note    Patient: Kathi Cleary    Procedure(s) Performed: Procedure(s) (LRB):  HEMORRHOIDECTOMY / PPH (N/A)  EXCISION, CONDYLOMA. (N/A)    Patient location: PACU    Anesthesia Type: general    Transport from OR: Transported from OR on room air with adequate spontaneous ventilation    Post pain: adequate analgesia    Post assessment: no apparent anesthetic complications    Post vital signs: stable    Level of consciousness: responds to stimulation and sedated    Nausea/Vomiting: no nausea/vomiting    Complications: none    Transfer of care protocol was followed      Last vitals:   Visit Vitals  BP (!) 147/97   Pulse 96   Temp 35.7 °C (96.3 °F) (Oral)   Resp 18   Wt 119.3 kg (263 lb)   SpO2 97%   Breastfeeding No   BMI 43.77 kg/m²

## 2022-06-02 NOTE — BRIEF OP NOTE
[facility] - Periop Services  Brief Operative Note    SUMMARY     Surgery Date: 6/2/2022     Surgeon(s) and Role:     * Hans Roth MD - Primary    Assisting Surgeon: None    Pre-op Diagnosis:  Hemorrhoids, unspecified hemorrhoid type [K64.9]    Post-op Diagnosis:  Post-Op Diagnosis Codes:     * Hemorrhoids, unspecified hemorrhoid type [K64.9]  1. PPH stapling of internal hemorrhoids with a 32 mm Covidien stapler  Procedure(s) (LRB):  HEMORRHOIDECTOMY / PPH (N/A)    Anesthesia: Spinal    Operative Findings patient is a 51-year-old  female with a history of hypertension hypercholesterolemia anxiety compression had a stroke in 2009 that was right hemispheric with left-sided weakness.  Patient has COPD smokes about 1/2 pack a day for 50 years since 2009.  Patient does have obstructive sleep apnea and is off CPAP for the present time now on medication.  Patient had a heart attack in 2009 3 vessel bypass and required preop cardiac clearance.  Patient complained of anal rectal bleeding pain and tenderness that appeared to be consistent with hemorrhoids but also had surrounding anal condyloma on the right and left gluteal cheeks and perianal region as well as anteriorly extending into the perineum.  Patient was consented for PPH stapling of her hemorrhoids and excision of some of the anal condyloma.  I could not excise all of the anal condyloma in the perianal region because it would have left a wound that was 2 cumbersome to take care postoperatively.  I felt a partial excision and staged occurrences would be her best option.    Patient was brought to the operating room and I initially attempted spinal anesthetic was unsuccessful and as result underwent general anesthetic patient was placed in prone mckayla-knife position.  She was prepped and draped in a sterile fashion.  Anal dilator was sutured with 0 silk.  Patient then underwent insertion of an anal probe and pursestring suturing of the  anorectal mucosa circumferentially to perform a mucosal proctectomy using it the 2nd rung of the anvil of a 32 mm Trufa PPH stapler the mucosal proctectomy was marked along the 6:00 a.m. anterior 9:00 a.m. left position.  No significant bleeding was noted sterile dressings were applied.    Patient had attention turned to the right perianal region where a cluster of anal condyloma measuring about 3 or 4 cm in length and about 1-2 cm in width were excised with the Bovie cautery.  The posterior 12 o'clock position was marked with single stitch and the medial left position of the right-sided anal condyloma were marked with a double stitch patient then had attention turned to left side where the anal condyloma on the left and rectum were far enough apart that I could take him off individually with the Bovie cautery through 4 were taken off in 1 setting .  There were anal condyloma on the anterior aspect of the anal rectum that I could not take off in this setting.  I think a better approach on these would be of lithotomy positioning for better excision with less damage to the surrounding tissue.  I will come back and take these out at a later date.  Patient overall did very well had no problems or difficulties    My plan is to have the patient go home on a liquid diet for at least a week keep everything clean with soap and water and shower on a regular basis at least 2 or 3 times a day.  I will follow up with her next week and keep up with her for the next several days until she is over the initial excision episode      Estimated Blood Loss: * No values recorded between 6/2/2022 12:00 AM and 6/2/2022 11:41 AM *    Estimated Blood Loss has been documented.         Specimens:   Specimen (24h ago, onward)            None          [id number]

## 2022-06-03 VITALS
HEART RATE: 91 BPM | WEIGHT: 263 LBS | RESPIRATION RATE: 20 BRPM | TEMPERATURE: 97 F | DIASTOLIC BLOOD PRESSURE: 80 MMHG | OXYGEN SATURATION: 97 % | SYSTOLIC BLOOD PRESSURE: 133 MMHG | BODY MASS INDEX: 43.77 KG/M2

## 2022-12-13 DIAGNOSIS — R22.0 MASS OF SCALP: Primary | ICD-10-CM

## 2022-12-15 ENCOUNTER — HOSPITAL ENCOUNTER (OUTPATIENT)
Dept: RADIOLOGY | Facility: HOSPITAL | Age: 52
Discharge: HOME | End: 2022-12-15
Attending: INTERNAL MEDICINE
Payer: COMMERCIAL

## 2022-12-15 DIAGNOSIS — M17.0 PRIMARY OSTEOARTHRITIS OF BOTH KNEES: ICD-10-CM

## 2022-12-15 DIAGNOSIS — M17.0 PRIMARY OSTEOARTHRITIS OF BOTH KNEES: Primary | ICD-10-CM

## 2022-12-19 ENCOUNTER — HOSPITAL ENCOUNTER (OUTPATIENT)
Dept: RADIOLOGY | Facility: HOSPITAL | Age: 52
Discharge: HOME | End: 2022-12-19
Attending: SURGERY
Payer: COMMERCIAL

## 2022-12-19 DIAGNOSIS — R22.0 MASS OF SCALP: ICD-10-CM

## 2022-12-19 PROCEDURE — 76536 US EXAM OF HEAD AND NECK: CPT | Mod: TC

## 2023-05-30 DIAGNOSIS — Z12.31 ENCOUNTER FOR SCREENING MAMMOGRAM FOR MALIGNANT NEOPLASM OF BREAST: Primary | ICD-10-CM

## 2023-08-15 ENCOUNTER — HOSPITAL ENCOUNTER (OUTPATIENT)
Dept: RADIOLOGY | Facility: HOSPITAL | Age: 53
Discharge: HOME | End: 2023-08-15
Attending: SURGERY
Payer: COMMERCIAL

## 2023-08-15 DIAGNOSIS — Z12.31 ENCOUNTER FOR SCREENING MAMMOGRAM FOR MALIGNANT NEOPLASM OF BREAST: ICD-10-CM

## 2023-08-15 PROCEDURE — 77067 SCR MAMMO BI INCL CAD: CPT | Mod: TC

## 2023-08-15 PROCEDURE — 77063 BREAST TOMOSYNTHESIS BI: CPT | Mod: 26,,, | Performed by: RADIOLOGY

## 2023-08-15 PROCEDURE — 77067 SCR MAMMO BI INCL CAD: CPT | Mod: 26,,, | Performed by: RADIOLOGY

## 2023-08-15 PROCEDURE — 77063 MAMMO DIGITAL SCREENING BILAT WITH TOMO: ICD-10-PCS | Mod: 26,,, | Performed by: RADIOLOGY

## 2023-08-15 PROCEDURE — 77067 MAMMO DIGITAL SCREENING BILAT WITH TOMO: ICD-10-PCS | Mod: 26,,, | Performed by: RADIOLOGY

## 2023-09-25 ENCOUNTER — LAB VISIT (OUTPATIENT)
Dept: LAB | Facility: HOSPITAL | Age: 53
End: 2023-09-25
Attending: SURGERY
Payer: COMMERCIAL

## 2023-09-25 DIAGNOSIS — K64.9 HEMORRHOIDS WITHOUT COMPLICATION: Primary | ICD-10-CM

## 2023-09-25 LAB — HEMOCCULT SP1 STL QL: NEGATIVE

## 2023-09-25 PROCEDURE — 82270 OCCULT BLOOD FECES: CPT

## 2023-11-06 ENCOUNTER — LAB VISIT (OUTPATIENT)
Dept: LAB | Facility: HOSPITAL | Age: 53
End: 2023-11-06
Attending: FAMILY MEDICINE
Payer: COMMERCIAL

## 2023-11-06 DIAGNOSIS — R53.83 FATIGUE, UNSPECIFIED TYPE: Primary | ICD-10-CM

## 2023-11-06 LAB
T3FREE SERPL-MCNC: 3.04 PG/ML (ref 1.58–3.91)
T4 FREE SERPL-MCNC: 0.8 NG/DL (ref 0.7–1.48)
TSH SERPL-ACNC: 2.59 UIU/ML (ref 0.35–4.94)

## 2023-11-06 PROCEDURE — 84443 ASSAY THYROID STIM HORMONE: CPT

## 2023-11-06 PROCEDURE — 36415 COLL VENOUS BLD VENIPUNCTURE: CPT

## 2023-11-06 PROCEDURE — 84481 FREE ASSAY (FT-3): CPT

## 2023-11-06 PROCEDURE — 84439 ASSAY OF FREE THYROXINE: CPT

## 2024-08-19 ENCOUNTER — HOSPITAL ENCOUNTER (OUTPATIENT)
Dept: RADIOLOGY | Facility: HOSPITAL | Age: 54
Discharge: HOME | End: 2024-08-19
Attending: NURSE PRACTITIONER
Payer: COMMERCIAL

## 2024-08-19 DIAGNOSIS — Z12.31 ENCOUNTER FOR SCREENING MAMMOGRAM FOR BREAST CANCER: ICD-10-CM

## 2024-08-19 PROCEDURE — 77067 SCR MAMMO BI INCL CAD: CPT | Mod: TC

## 2024-08-19 PROCEDURE — 77063 BREAST TOMOSYNTHESIS BI: CPT | Mod: 26,,, | Performed by: RADIOLOGY

## 2024-08-19 PROCEDURE — 77067 SCR MAMMO BI INCL CAD: CPT | Mod: 26,,, | Performed by: RADIOLOGY

## 2024-09-06 ENCOUNTER — HOSPITAL ENCOUNTER (EMERGENCY)
Facility: HOSPITAL | Age: 54
Discharge: HOME | End: 2024-09-06
Attending: EMERGENCY MEDICINE
Payer: COMMERCIAL

## 2024-09-06 VITALS
BODY MASS INDEX: 41.99 KG/M2 | DIASTOLIC BLOOD PRESSURE: 70 MMHG | HEIGHT: 65 IN | WEIGHT: 252 LBS | HEART RATE: 86 BPM | TEMPERATURE: 98 F | OXYGEN SATURATION: 97 % | SYSTOLIC BLOOD PRESSURE: 101 MMHG | RESPIRATION RATE: 16 BRPM

## 2024-09-06 DIAGNOSIS — R07.9 CHEST PAIN: ICD-10-CM

## 2024-09-06 LAB
ALBUMIN SERPL-MCNC: 4.1 G/DL (ref 3.5–5)
ALBUMIN/GLOB SERPL: 1.1 RATIO (ref 1.1–2)
ALP SERPL-CCNC: 131 UNIT/L (ref 40–150)
ALT SERPL-CCNC: 27 UNIT/L (ref 0–55)
ANION GAP SERPL CALC-SCNC: 13 MEQ/L
AST SERPL-CCNC: 31 UNIT/L (ref 5–34)
BASOPHILS # BLD AUTO: 0.04 X10(3)/MCL
BASOPHILS NFR BLD AUTO: 0.5 %
BILIRUB SERPL-MCNC: 0.3 MG/DL
BNP BLD-MCNC: 28.2 PG/ML
BUN SERPL-MCNC: 33.1 MG/DL (ref 9.8–20.1)
CALCIUM SERPL-MCNC: 9.9 MG/DL (ref 8.4–10.2)
CHLORIDE SERPL-SCNC: 101 MMOL/L (ref 98–107)
CO2 SERPL-SCNC: 29 MMOL/L (ref 22–29)
CREAT SERPL-MCNC: 1.5 MG/DL (ref 0.55–1.02)
CREAT/UREA NIT SERPL: 22
D DIMER PPP IA.FEU-MCNC: 0.28 UG/ML FEU (ref 0–0.5)
EOSINOPHIL # BLD AUTO: 0.17 X10(3)/MCL (ref 0–0.9)
EOSINOPHIL NFR BLD AUTO: 2.1 %
ERYTHROCYTE [DISTWIDTH] IN BLOOD BY AUTOMATED COUNT: 13.4 % (ref 11.5–17)
GFR SERPLBLD CREATININE-BSD FMLA CKD-EPI: 41 ML/MIN/1.73/M2
GLOBULIN SER-MCNC: 3.6 GM/DL (ref 2.4–3.5)
GLUCOSE SERPL-MCNC: 119 MG/DL (ref 74–100)
HCT VFR BLD AUTO: 41.1 % (ref 37–47)
HGB BLD-MCNC: 14.2 G/DL (ref 12–16)
IMM GRANULOCYTES # BLD AUTO: 0.02 X10(3)/MCL (ref 0–0.04)
IMM GRANULOCYTES NFR BLD AUTO: 0.2 %
LYMPHOCYTES # BLD AUTO: 3.42 X10(3)/MCL (ref 0.6–4.6)
LYMPHOCYTES NFR BLD AUTO: 41.7 %
MCH RBC QN AUTO: 32.4 PG (ref 27–31)
MCHC RBC AUTO-ENTMCNC: 34.5 G/DL (ref 33–36)
MCV RBC AUTO: 93.8 FL (ref 80–94)
MONOCYTES # BLD AUTO: 0.37 X10(3)/MCL (ref 0.1–1.3)
MONOCYTES NFR BLD AUTO: 4.5 %
NEUTROPHILS # BLD AUTO: 4.18 X10(3)/MCL (ref 2.1–9.2)
NEUTROPHILS NFR BLD AUTO: 51 %
NRBC BLD AUTO-RTO: 0 %
OHS QRS DURATION: 136 MS
OHS QTC CALCULATION: 466 MS
PLATELET # BLD AUTO: 285 X10(3)/MCL (ref 130–400)
PMV BLD AUTO: 10.4 FL (ref 7.4–10.4)
POTASSIUM SERPL-SCNC: 4.3 MMOL/L (ref 3.5–5.1)
PROT SERPL-MCNC: 7.7 GM/DL (ref 6.4–8.3)
RBC # BLD AUTO: 4.38 X10(6)/MCL (ref 4.2–5.4)
SODIUM SERPL-SCNC: 143 MMOL/L (ref 136–145)
TROPONIN I SERPL-MCNC: 0.01 NG/ML (ref 0–0.04)
TROPONIN I SERPL-MCNC: 0.01 NG/ML (ref 0–0.04)
WBC # BLD AUTO: 8.2 X10(3)/MCL (ref 4.5–11.5)

## 2024-09-06 PROCEDURE — 85025 COMPLETE CBC W/AUTO DIFF WBC: CPT | Performed by: EMERGENCY MEDICINE

## 2024-09-06 PROCEDURE — 99285 EMERGENCY DEPT VISIT HI MDM: CPT | Mod: 25

## 2024-09-06 PROCEDURE — 80053 COMPREHEN METABOLIC PANEL: CPT | Performed by: EMERGENCY MEDICINE

## 2024-09-06 PROCEDURE — 93010 ELECTROCARDIOGRAM REPORT: CPT | Mod: ,,, | Performed by: INTERNAL MEDICINE

## 2024-09-06 PROCEDURE — 85379 FIBRIN DEGRADATION QUANT: CPT | Performed by: EMERGENCY MEDICINE

## 2024-09-06 PROCEDURE — 83880 ASSAY OF NATRIURETIC PEPTIDE: CPT | Performed by: EMERGENCY MEDICINE

## 2024-09-06 PROCEDURE — 93005 ELECTROCARDIOGRAM TRACING: CPT

## 2024-09-06 PROCEDURE — 96360 HYDRATION IV INFUSION INIT: CPT

## 2024-09-06 PROCEDURE — 25000003 PHARM REV CODE 250: Performed by: EMERGENCY MEDICINE

## 2024-09-06 PROCEDURE — 84484 ASSAY OF TROPONIN QUANT: CPT | Performed by: EMERGENCY MEDICINE

## 2024-09-06 RX ORDER — SODIUM CHLORIDE 9 MG/ML
1000 INJECTION, SOLUTION INTRAVENOUS
Status: COMPLETED | OUTPATIENT
Start: 2024-09-06 | End: 2024-09-06

## 2024-09-06 RX ADMIN — SODIUM CHLORIDE 1000 ML: 9 INJECTION, SOLUTION INTRAVENOUS at 01:09

## 2024-09-06 NOTE — ED PROVIDER NOTES
Encounter Date: 2024       History     Chief Complaint   Patient presents with    Chest Pain     53-year-old female with a history of HTN, CAD, DM, anxiety, depression, IBS complains of chest pain intermittent for the last week. Its a sharp sticking sensation and worse with activity. No chest pain right at the moment.  No fever or cough.  No abdominal pain.  She has peripheral neuropathy and states she always has some lower extremity pain but it is at baseline.  She does have some shortness of breath with exertion.  Her cardiologist is Dr. Swift but she has not yet seen him for this issue.  She does have a history of CABG in .    The history is provided by the patient.     Review of patient's allergies indicates:  No Known Allergies  Past Medical History:   Diagnosis Date    Anxiety     Depression     Diabetes     Heart disease     HTN (hypertension)     IBS (irritable bowel syndrome)      Past Surgical History:   Procedure Laterality Date    BACK SURGERY       SECTION      CHOLECYSTECTOMY      COLONOSCOPY N/A 2022    Procedure: COLONOSCOPY;  Surgeon: Hans Roth MD;  Location: [facility] OR;  Service: Endoscopy;  Laterality: N/A;    CORONARY ARTERY BYPASS GRAFT      EXCISION OF CONDYLOMA N/A 2022    Procedure: EXCISION, CONDYLOMA.;  Surgeon: Hans Roth MD;  Location: [facility] OR;  Service: General;  Laterality: N/A;    EXCISIONAL HEMORRHOIDECTOMY N/A 2022    Procedure: HEMORRHOIDECTOMY / PPH;  Surgeon: Hans Roth MD;  Location: [facility] OR;  Service: General;  Laterality: N/A;    HYSTERECTOMY       Family History   Problem Relation Name Age of Onset    Cancer Mother      No Known Problems Father       Social History     Tobacco Use    Smoking status: Never    Smokeless tobacco: Never   Substance Use Topics    Alcohol use: Not Currently    Drug use: Never     Review of Systems   Respiratory:  Positive for shortness of breath.    Cardiovascular:  Positive for chest pain.   All  other systems reviewed and are negative.      Physical Exam     Initial Vitals [09/06/24 0024]   BP Pulse Resp Temp SpO2   101/70 86 16 98.2 °F (36.8 °C) 97 %      MAP       --         Physical Exam    Nursing note and vitals reviewed.  Constitutional: She appears well-developed and well-nourished. She is not diaphoretic. No distress.   HENT:   Head: Normocephalic and atraumatic.   Mouth/Throat: Oropharynx is clear and moist.   Eyes: Conjunctivae are normal. Pupils are equal, round, and reactive to light.   Neck: Neck supple.   Cardiovascular:  Normal rate, regular rhythm, normal heart sounds and intact distal pulses.           Pulmonary/Chest: Breath sounds normal. No respiratory distress. She has no wheezes. She has no rhonchi. She has no rales.   Abdominal: Abdomen is soft. She exhibits no distension. There is no abdominal tenderness. There is no guarding.   Musculoskeletal:         General: No tenderness or edema. Normal range of motion.      Cervical back: Neck supple.     Neurological: She is alert and oriented to person, place, and time.   Skin: Skin is warm and dry. Capillary refill takes less than 2 seconds. No rash noted.   Psychiatric: She has a normal mood and affect. Thought content normal.         ED Course   Procedures  Labs Reviewed   COMPREHENSIVE METABOLIC PANEL - Abnormal       Result Value    Sodium 143      Potassium 4.3      Chloride 101      CO2 29      Glucose 119 (*)     Blood Urea Nitrogen 33.1 (*)     Creatinine 1.50 (*)     Calcium 9.9      Protein Total 7.7      Albumin 4.1      Globulin 3.6 (*)     Albumin/Globulin Ratio 1.1      Bilirubin Total 0.3            ALT 27      AST 31      eGFR 41      Anion Gap 13.0      BUN/Creatinine Ratio 22     CBC WITH DIFFERENTIAL - Abnormal    WBC 8.20      RBC 4.38      Hgb 14.2      Hct 41.1      MCV 93.8      MCH 32.4 (*)     MCHC 34.5      RDW 13.4      Platelet 285      MPV 10.4      Neut % 51.0      Lymph % 41.7      Mono % 4.5      Eos %  2.1      Basophil % 0.5      Lymph # 3.42      Neut # 4.18      Mono # 0.37      Eos # 0.17      Baso # 0.04      IG# 0.02      IG% 0.2      NRBC% 0.0     TROPONIN I - Normal    Troponin-I 0.010     B-TYPE NATRIURETIC PEPTIDE - Normal    Natriuretic Peptide 28.2     D DIMER, QUANTITATIVE - Normal    D-Dimer 0.28     TROPONIN I - Normal    Troponin-I 0.012     CBC W/ AUTO DIFFERENTIAL    Narrative:     The following orders were created for panel order CBC auto differential.  Procedure                               Abnormality         Status                     ---------                               -----------         ------                     CBC with Differential[[id number]]       Abnormal            Final result                 Please view results for these tests on the individual orders.        ECG Results              EKG 12-lead (Final result)        Collection Time Result Time QRS Duration OHS QTC Calculation    09/06/24 00:07:34 09/06/24 17:27:49 136 466                     Final result by Interface, Lab In [facility] (09/06/24 17:27:52)                   Narrative:    Test Reason : R07.9,    Vent. Rate : 075 BPM     Atrial Rate : 075 BPM     P-R Int : 190 ms          QRS Dur : 136 ms      QT Int : 418 ms       P-R-T Axes : 056 041 041 degrees     QTc Int : 466 ms    Normal sinus rhythm  Right bundle branch block  Abnormal ECG  When compared with ECG of 31-MAY-2022 09:16,  Borderline criteria for Lateral infarct are no longer Present  Confirmed by Lebron Jaramillo MD ([id number]) on 9/6/2024 5:27:42 PM    Referred By: [email or web address]   SELF           Confirmed By:Lebron Jaramillo MD                                  Imaging Results              X-Ray Chest AP Portable (Final result)  Result time 09/06/24 06:21:15      Final result by Wander Mejia MD (09/06/24 06:21:15)                   Impression:      No acute findings in the chest      Electronically signed by: Wander Mejia MD  Date:    09/06/2024  Time:    06:21                Narrative:    EXAMINATION:  XR CHEST AP PORTABLE    CLINICAL HISTORY:  Chest Pain;    COMPARISON:  05/31/2022    FINDINGS:  Single view of the chest shows no focal consolidation, pneumothorax or pleural effusion.  Cardiac silhouette and pulmonary vasculature are normal.                                       Medications   0.9%  NaCl infusion (0 mLs Intravenous Stopped 9/6/24 0242)     Medical Decision Making  See HPI for narrative    Differential diagnosis includes but is not limited to ACS, pneumonia, pancreatitis, gastroenteritis, chest wall pain, multiple other etiologies    Problems Addressed:  Chest pain:     Details: Patient was seen and evaluated in the emergency department for chest pain.  EKG is benign, troponin is negative x2.  Patient has not had chest pain since she has been here and I discussed with her the pros and cons of hospital admission due to her known history of CAD.  However, she has had no chest pain since being here and states that she would like to call her cardiologist in the morning.  She is asymptomatic and stable, ER return precautions were discussed.    Amount and/or Complexity of Data Reviewed  Labs: ordered. Decision-making details documented in ED Course.  Radiology: ordered.    Risk  Prescription drug management.               ED Course as of 09/07/24 0214   Fri Sep 06, 2024   0125 WBC: 8.20 [SH]   0125 Hemoglobin: 14.2 [SH]   0125 Hematocrit: 41.1 [SH]   0125 Platelet Count: 285 [SH]   0134 BUN(!): 33.1 [SH]   0134 Creatinine(!): 1.50 [SH]   0441 Troponin I: 0.012 [SH]   0444 Vital signs stable, no chest pain since being here.  No ectopy on monitor. [SH]      ED Course User Index  [SH] Patience Jean Baptiste MD                           Clinical Impression:  Final diagnoses:  [R07.9] Chest pain          ED Disposition Condition    Discharge Stable          ED Prescriptions    None       Follow-up Information       Follow up With Specialties Details Why Contact Rozina Swift  Easton Olivera MD Cardiology Schedule an appointment as soon as possible for a visit   [facility] [address] [facility] [address]  [phone]               Patience Jean Baptiste MD  09/07/24 0217

## 2024-09-18 ENCOUNTER — HOSPITAL ENCOUNTER (EMERGENCY)
Facility: HOSPITAL | Age: 54
Discharge: HOME | End: 2024-09-18
Attending: EMERGENCY MEDICINE
Payer: COMMERCIAL

## 2024-09-18 VITALS
WEIGHT: 250 LBS | DIASTOLIC BLOOD PRESSURE: 74 MMHG | SYSTOLIC BLOOD PRESSURE: 112 MMHG | HEART RATE: 85 BPM | OXYGEN SATURATION: 96 % | BODY MASS INDEX: 41.65 KG/M2 | HEIGHT: 65 IN | RESPIRATION RATE: 18 BRPM | TEMPERATURE: 98 F

## 2024-09-18 DIAGNOSIS — M79.89 RIGHT LEG SWELLING: ICD-10-CM

## 2024-09-18 DIAGNOSIS — M54.31 SCIATICA OF RIGHT SIDE: Primary | ICD-10-CM

## 2024-09-18 PROCEDURE — 96372 THER/PROPH/DIAG INJ SC/IM: CPT | Performed by: PHYSICIAN ASSISTANT

## 2024-09-18 PROCEDURE — 63600175 PHARM REV CODE 636 W HCPCS: Performed by: PHYSICIAN ASSISTANT

## 2024-09-18 PROCEDURE — 99284 EMERGENCY DEPT VISIT MOD MDM: CPT | Mod: 25

## 2024-09-18 RX ORDER — KETOROLAC TROMETHAMINE 10 MG/1
10 TABLET, FILM COATED ORAL EVERY 6 HOURS
Qty: 20 TABLET | Refills: 0 | Status: SHIPPED | OUTPATIENT
Start: 2024-09-18 | End: 2024-09-23

## 2024-09-18 RX ORDER — KETOROLAC TROMETHAMINE 30 MG/ML
60 INJECTION, SOLUTION INTRAMUSCULAR; INTRAVENOUS
Status: COMPLETED | OUTPATIENT
Start: 2024-09-18 | End: 2024-09-18

## 2024-09-18 RX ADMIN — KETOROLAC TROMETHAMINE 60 MG: 60 INJECTION, SOLUTION INTRAMUSCULAR at 08:09

## 2024-09-18 NOTE — ED PROVIDER NOTES
Encounter Date: 2024       History     Chief Complaint   Patient presents with    Leg Pain     Right leg pain from buttock to heel x5 months; hx of sciatica; pain worse when walking; neuro intact; sent by pain management doctor for eval     53-year-old white female with history of anxiety, depression, diabetes, heart disease, hypertension, and IBS and ongoing chronic low back pain presents to the emergency room with right lower extremity pain and swelling for the last 5 months has worsened in the last 3 weeks.  Patient sent here by pain management for evaluation and treatment of blood clot in the right lower extremity.  Patient has a cardiologist but has not seen him for this.  Denies difficulty breathing, orthopnea, or dyspnea on exertion.  No recent travel.    The history is provided by the patient. No  was used.     Review of patient's allergies indicates:  No Known Allergies  Past Medical History:   Diagnosis Date    Anxiety     Depression     Diabetes     Heart disease     HTN (hypertension)     IBS (irritable bowel syndrome)      Past Surgical History:   Procedure Laterality Date    BACK SURGERY       SECTION      CHOLECYSTECTOMY      COLONOSCOPY N/A 2022    Procedure: COLONOSCOPY;  Surgeon: Hans Roth MD;  Location: [facility] OR;  Service: Endoscopy;  Laterality: N/A;    CORONARY ARTERY BYPASS GRAFT      EXCISION OF CONDYLOMA N/A 2022    Procedure: EXCISION, CONDYLOMA.;  Surgeon: Hans Roth MD;  Location: [facility] OR;  Service: General;  Laterality: N/A;    EXCISIONAL HEMORRHOIDECTOMY N/A 2022    Procedure: HEMORRHOIDECTOMY / PPH;  Surgeon: Hans Roth MD;  Location: [facility] OR;  Service: General;  Laterality: N/A;    HYSTERECTOMY       Family History   Problem Relation Name Age of Onset    Cancer Mother      No Known Problems Father       Social History     Tobacco Use    Smoking status: Never    Smokeless tobacco: Never   Substance Use Topics     Alcohol use: Not Currently    Drug use: Never     Review of Systems   Constitutional: Negative.    HENT: Negative.     Eyes: Negative.    Respiratory: Negative.  Negative for cough, choking, chest tightness and shortness of breath.    Cardiovascular: Negative.    Gastrointestinal: Negative.    Genitourinary: Negative.    Musculoskeletal:  Positive for back pain and gait problem.       Physical Exam     Initial Vitals [09/18/24 1829]   BP Pulse Resp Temp SpO2   112/74 85 18 97.7 °F (36.5 °C) 96 %      MAP       --         Physical Exam    Nursing note and vitals reviewed.  Constitutional: She appears well-developed and well-nourished. No distress.   HENT:   Head: Normocephalic and atraumatic.   Eyes: Conjunctivae, EOM and lids are normal. Pupils are equal, round, and reactive to light.   Neck: Neck supple.   Normal range of motion.  Cardiovascular:  Normal rate and regular rhythm.           Pulses:       Posterior tibial pulses are 2+ on the right side.   Pulmonary/Chest: Effort normal.   Abdominal: Abdomen is flat.   Musculoskeletal:      Cervical back: Normal range of motion and neck supple.      Right lower leg: Swelling and tenderness present. Edema present.      Left lower leg: Normal.        Legs:       Comments: Positive Homans right lower extremity.  Erythema noted right lower extremity distal to the right knee.     Neurological: She is alert and oriented to person, place, and time. She has normal strength. She is not disoriented. No cranial nerve deficit or sensory deficit. GCS eye subscore is 4. GCS verbal subscore is 5. GCS motor subscore is 6.   Skin: Skin is warm and intact.   Psychiatric: She has a normal mood and affect. Her speech is normal and behavior is normal. Judgment and thought content normal. Cognition and memory are normal.         ED Course   Procedures  Labs Reviewed - No data to display       Imaging Results              US Lower Extremity Veins Right (Final result)  Result time 09/18/24  20:19:29      Final result by Dinh Suazo MD (09/18/24 20:19:29)                   Impression:      No evidence of deep venous thrombosis in the right lower extremity.      Electronically signed by: Dinh Suazo MD  Date:    09/18/2024  Time:    20:19               Narrative:    EXAMINATION:  US LOWER EXTREMITY VEINS RIGHT    CLINICAL HISTORY:  Other specified soft tissue disorders    TECHNIQUE:  Duplex and color flow Doppler evaluation and graded compression of the right lower extremity veins was performed.    COMPARISON:  None    FINDINGS:  Right thigh veins: The common femoral, femoral, popliteal, upper greater saphenous, and deep femoral veins are patent and free of thrombus. The veins are normally compressible and have normal phasic flow and augmentation response.    Right calf veins: The visualized calf veins are patent.    Miscellaneous: None                                       Medications   ketorolac injection 60 mg (60 mg Intramuscular Given 9/18/24 2034)     Medical Decision Making  53-year-old white female with history of anxiety, depression, diabetes, heart disease, hypertension, and IBS and ongoing chronic low back pain presents to the emergency room with right lower extremity pain and swelling for the last 5 months has worsened in the last 3 weeks.  Patient sent here by pain management for evaluation and treatment of blood clot in the right lower extremity.  Patient has a cardiologist but has not seen him for this.  Denies difficulty breathing, orthopnea, or dyspnea on exertion.  No recent travel.    Problems Addressed:  Right leg swelling: chronic illness or injury with exacerbation, progression, or side effects of treatment     Details: Differential diagnosis included but not limited to:  Sciatica, chronic low back pain, deep vein thrombosis, peripheral arterial disease, peripheral vascular disease    Patient still pending MRI results from 1 month ago.  Patient will discuss with  On follow up.   Toradol given.  We will follow up with Cardiology on the 9th of October.    Risk  Prescription drug management.                                      Clinical Impression:  Final diagnoses:  [M79.89] Right leg swelling  [M54.31] Sciatica of right side (Primary)          ED Disposition Condition    Discharge Stable        This note was typed partially using voice recognition software.  Please be reminded that not all corrections/addendums to grammar may have been made prior to closing of this chart.    ED Prescriptions       Medication Sig Dispense Start Date End Date Auth. Provider    ketorolac (TORADOL) 10 mg tablet Take 1 tablet (10 mg total) by mouth every 6 (six) hours. for 5 days 20 tablet 9/18/2024 9/23/2024 Esther Shea PA          Follow-up Information       Follow up With Specialties Details Why Contact Info    Bridget Boswell, [facility], Emergency Medicine Schedule an appointment as soon as possible for a visit today  [address] [facility] [address] [phone]      [facility] - Emergency Dept Emergency Medicine  If symptoms worsen, As needed [id number]  [facility] [address] [phone]             Esther Shea PA  09/18/24 9121

## 2024-09-18 NOTE — Clinical Note
"Kathi "Kathi" Evin was seen and treated in our emergency department on 9/18/2024.  She may return to work on 09/23/2024.  Please excuse for up to 48hours prior for symptoms present at that time if possible. Patient may also return sooner than above date if symptoms improve/resolve.       If you have any questions or concerns, please don't hesitate to call.      Esther Shea PA"

## 2024-10-29 ENCOUNTER — CLINICAL SUPPORT (OUTPATIENT)
Dept: RESPIRATORY THERAPY | Facility: HOSPITAL | Age: 54
End: 2024-10-29
Attending: NEUROLOGICAL SURGERY
Payer: COMMERCIAL

## 2024-10-29 ENCOUNTER — HOSPITAL ENCOUNTER (OUTPATIENT)
Dept: RADIOLOGY | Facility: HOSPITAL | Age: 54
Discharge: HOME | End: 2024-10-29
Attending: NEUROLOGICAL SURGERY
Payer: COMMERCIAL

## 2024-10-29 DIAGNOSIS — Z01.818 PRE-OP EXAM: Primary | ICD-10-CM

## 2024-10-29 DIAGNOSIS — Z01.811 PRE-OP CHEST EXAM: ICD-10-CM

## 2024-10-29 DIAGNOSIS — Z01.818 PRE-OP EXAM: ICD-10-CM

## 2024-10-29 PROCEDURE — 71046 X-RAY EXAM CHEST 2 VIEWS: CPT | Mod: TC

## 2024-10-29 PROCEDURE — 93010 ELECTROCARDIOGRAM REPORT: CPT | Mod: ,,, | Performed by: INTERNAL MEDICINE

## 2024-10-29 PROCEDURE — 93005 ELECTROCARDIOGRAM TRACING: CPT

## 2024-10-31 LAB
OHS QRS DURATION: 142 MS
OHS QTC CALCULATION: 477 MS

## 2025-04-29 ENCOUNTER — HOSPITAL ENCOUNTER (OUTPATIENT)
Dept: RADIOLOGY | Facility: HOSPITAL | Age: 55
Discharge: HOME | End: 2025-04-29
Attending: NURSE PRACTITIONER
Payer: COMMERCIAL

## 2025-04-29 DIAGNOSIS — M79.671 RIGHT FOOT PAIN: ICD-10-CM

## 2025-04-29 PROCEDURE — 73630 X-RAY EXAM OF FOOT: CPT | Mod: TC,RT

## 2025-07-02 ENCOUNTER — APPOINTMENT (OUTPATIENT)
Dept: RADIOLOGY | Facility: HOSPITAL | Age: 55
End: 2025-07-02
Attending: NEUROLOGICAL SURGERY
Payer: COMMERCIAL

## 2025-07-02 DIAGNOSIS — M48.062 PSEUDOCLAUDICATION SYNDROME: ICD-10-CM

## 2025-07-02 PROCEDURE — 72148 MRI LUMBAR SPINE W/O DYE: CPT | Mod: TC

## 2025-07-04 ENCOUNTER — HOSPITAL ENCOUNTER (EMERGENCY)
Facility: HOSPITAL | Age: 55
Discharge: HOME | End: 2025-07-04
Attending: EMERGENCY MEDICINE
Payer: COMMERCIAL

## 2025-07-04 VITALS
SYSTOLIC BLOOD PRESSURE: 138 MMHG | DIASTOLIC BLOOD PRESSURE: 88 MMHG | BODY MASS INDEX: 36.11 KG/M2 | RESPIRATION RATE: 20 BRPM | OXYGEN SATURATION: 98 % | HEART RATE: 78 BPM | TEMPERATURE: 98 F | WEIGHT: 217 LBS

## 2025-07-04 DIAGNOSIS — M51.34 DEGENERATIVE DISC DISEASE, THORACIC: Primary | ICD-10-CM

## 2025-07-04 PROCEDURE — 96372 THER/PROPH/DIAG INJ SC/IM: CPT | Performed by: PHYSICIAN ASSISTANT

## 2025-07-04 PROCEDURE — 63600175 PHARM REV CODE 636 W HCPCS: Mod: JZ,TB | Performed by: PHYSICIAN ASSISTANT

## 2025-07-04 PROCEDURE — 99285 EMERGENCY DEPT VISIT HI MDM: CPT | Mod: 25

## 2025-07-04 PROCEDURE — 25000003 PHARM REV CODE 250: Performed by: PHYSICIAN ASSISTANT

## 2025-07-04 RX ORDER — LIDOCAINE 50 MG/G
1 PATCH TOPICAL
Status: DISCONTINUED | OUTPATIENT
Start: 2025-07-04 | End: 2025-07-04 | Stop reason: HOSPADM

## 2025-07-04 RX ORDER — KETOROLAC TROMETHAMINE 30 MG/ML
30 INJECTION, SOLUTION INTRAMUSCULAR; INTRAVENOUS
Status: COMPLETED | OUTPATIENT
Start: 2025-07-04 | End: 2025-07-04

## 2025-07-04 RX ADMIN — KETOROLAC TROMETHAMINE 30 MG: 60 INJECTION, SOLUTION INTRAMUSCULAR at 05:07

## 2025-07-04 RX ADMIN — LIDOCAINE 1 PATCH: 50 PATCH CUTANEOUS at 05:07

## 2025-07-04 NOTE — ED PROVIDER NOTES
Encounter Date: 2025       History     Chief Complaint   Patient presents with    Back Pain     PT W CHRONIC BACK PAIN CO INCREASE IN PAIN TO RT MID POSTERIOR BACK/RIB AREA WHEN SHE PULLED HERSELF UP OFF OF MRI MACHINE ON 25.      54-year-old female with a history of anxiety, depression, diabetes, heart disease, hypertension, IBS and chronic back pain, presents to the emergency department with reports of mid back pain & muscle spasms that began when she pulled herself up with the MRI machine on 2025.  She rates her pain 5/10.  She states she is already prescribed narcotic pain medication however it is helping with the pain.  She denies bladder/bowel incontinence, dysuria, numbness tingling.    The history is provided by the patient. No  was used.     Review of patient's allergies indicates:  No Known Allergies  Past Medical History:   Diagnosis Date    Anxiety     Depression     Diabetes     Heart disease     HTN (hypertension)     IBS (irritable bowel syndrome)      Past Surgical History:   Procedure Laterality Date    BACK SURGERY       SECTION      CHOLECYSTECTOMY      COLONOSCOPY N/A 2022    Procedure: COLONOSCOPY;  Surgeon: Hans Roth MD;  Location: [facility] OR;  Service: Endoscopy;  Laterality: N/A;    CORONARY ARTERY BYPASS GRAFT      EXCISION OF CONDYLOMA N/A 2022    Procedure: EXCISION, CONDYLOMA.;  Surgeon: Hans Roth MD;  Location: [facility] OR;  Service: General;  Laterality: N/A;    EXCISIONAL HEMORRHOIDECTOMY N/A 2022    Procedure: HEMORRHOIDECTOMY / PPH;  Surgeon: Hans Roth MD;  Location: [facility] OR;  Service: General;  Laterality: N/A;    HYSTERECTOMY       Family History   Problem Relation Name Age of Onset    Cancer Mother      No Known Problems Father       Social History[1]  Review of Systems   Musculoskeletal:  Positive for back pain.       Physical Exam     Initial Vitals [25 1633]   BP Pulse Resp Temp SpO2   (!) 142/97 80  18 97.9 °F (36.6 °C) 98 %      MAP       --         Physical Exam    Nursing note and vitals reviewed.  Constitutional: She appears well-developed and well-nourished.   HENT:   Head: Normocephalic and atraumatic.   Cardiovascular:  Normal rate.           Pulmonary/Chest: Breath sounds normal.   Abdominal: Abdomen is soft. Bowel sounds are normal.   Musculoskeletal:         General: Normal range of motion.      Cervical back: No bony tenderness.      Thoracic back: No bony tenderness.      Lumbar back: No bony tenderness.        Back:      Neurological: She is alert.   Skin: Skin is warm. Capillary refill takes less than 2 seconds.         ED Course   Procedures  Labs Reviewed - No data to display       Imaging Results              CT Thoracic Spine Without Contrast (Preliminary result)  Result time 07/04/25 19:00:28      Preliminary result by Bharat Diamond MD (07/04/25 19:00:28)                   Narrative:    START OF REPORT:  Technique: CT of the thoracic spine without contrast with direct axial as well as sagittal and coronal reconstruction images.    Comparison: None.    Dosage Information: Automated Exposure Control was utilized.    Clinical History: Mid-back pain, compression fracture suspected.    Findings:  Mineralization of the bony structures: Within normal limits for age.  Bone marrow: Within normal limits.  Vertebral Fusion: None.  Curvature: Normal thoracic kyphosis.  Fractures: No acute fracture, dislocation or subluxation is identified in the thoracic spine.  Degenerative changes: Mild thoracic spine spondylosis is seen.  Intervertebral disc spaces: Mildly decreased disc height is seen at T5-T6.  Osteophytes: Mild multilevel osteophytes are seen.  Endplates: Mild endplate sclerosis is seen involving T6.  Facet degenerative changes: Mild bilateral multilevel facet degenerative changes are seen.  Spinal canal: Unremarkable with no bony spinal canal stenosis identified.    Notifications: Cord  stimulators are seen in at the level of T9 and T10.      Impression:  1. No acute fracture, dislocation or subluxation is identified in the thoracic spine.  2. Degenerative changes as above.                                         Medications   ketorolac injection 30 mg (30 mg Intramuscular Given 7/4/25 1741)     Medical Decision Making  54-year-old female with a history of anxiety, depression, diabetes, heart disease, hypertension, IBS and chronic back pain, presents to the emergency department with reports of mid back pain & muscle spasms that began when she pulled herself up with the MRI machine on 07/02/2025.  She rates her pain 5/10.  She states she is already prescribed narcotic pain medication however it is helping with the pain.  She denies bladder/bowel incontinence, dysuria, numbness tingling.    DDx:  muscle spasm, muscle strain, stenosis, arthritis, fracture, degenerative disc disease, osteophyte     Toradol IM and lidocaine patch given in the ED. she states she feels significantly better afterwards.  Degenerative changes on CT.  Patient states she has significant relief and is ready for discharge.    Amount and/or Complexity of Data Reviewed  Radiology: ordered. Decision-making details documented in ED Course.    Risk  Prescription drug management.               ED Course as of 07/04/25 2315   Fri Jul 04, 2025 1925 The patient is resting comfortably and in no acute distress.  She states that her symptoms have improved after treatment in Emergency Department. I personally discussed her test results and treatment plan.  Gave strict ED precautions, discussed specific conditions for return to the emergency department and importance of follow up with her primary care provider.  Patient voices understanding and agrees to the plan discussed. All patients' questions have been answered at this time.   She has remained hemodynamically stable throughout entire stay in ED and is stable for discharge home.  [ER]       ED Course User Index  [ER] Carolina Sorto PA                           Clinical Impression:  Final diagnoses:  [M51.34] Degenerative disc disease, thoracic (Primary)          ED Disposition Condition    Discharge Stable          ED Prescriptions    None       Follow-up Information       Follow up With Specialties Details Why Contact Info    [facility] - Emergency Dept Emergency Medicine  As needed, If symptoms worsen [address] [facility] [address] [phone]    Bridget Boswell, FNP Family Medicine, Emergency Medicine Schedule an appointment as soon as possible for a visit in 2 days  [address] [place] [address]  [phone]                     [1]   Social History  Tobacco Use    Smoking status: Former     Types: Cigarettes    Smokeless tobacco: Never   Vaping Use    Vaping status: Never Used   Substance Use Topics    Alcohol use: Not Currently    Drug use: Never        Carolina Sorto PA  07/04/25 9819

## 2025-07-14 ENCOUNTER — APPOINTMENT (OUTPATIENT)
Dept: RADIOLOGY | Facility: HOSPITAL | Age: 55
End: 2025-07-14
Attending: NEUROLOGICAL SURGERY
Payer: COMMERCIAL

## 2025-07-14 DIAGNOSIS — M48.062 PSEUDOCLAUDICATION SYNDROME: ICD-10-CM

## 2025-07-14 PROCEDURE — 72141 MRI NECK SPINE W/O DYE: CPT | Mod: TC

## 2025-07-15 ENCOUNTER — HOSPITAL ENCOUNTER (EMERGENCY)
Facility: HOSPITAL | Age: 55
Discharge: HOME | End: 2025-07-15
Attending: EMERGENCY MEDICINE
Payer: COMMERCIAL

## 2025-07-15 VITALS
DIASTOLIC BLOOD PRESSURE: 78 MMHG | OXYGEN SATURATION: 98 % | TEMPERATURE: 98 F | RESPIRATION RATE: 20 BRPM | HEART RATE: 71 BPM | WEIGHT: 217 LBS | SYSTOLIC BLOOD PRESSURE: 129 MMHG | BODY MASS INDEX: 36.15 KG/M2 | HEIGHT: 65 IN

## 2025-07-15 DIAGNOSIS — E86.0 DEHYDRATION: ICD-10-CM

## 2025-07-15 DIAGNOSIS — R53.83 FATIGUE, UNSPECIFIED TYPE: Primary | ICD-10-CM

## 2025-07-15 DIAGNOSIS — R55 SYNCOPE: ICD-10-CM

## 2025-07-15 LAB
ACCEPTIBLE SP GR UR QL: <=1.005 (ref 1–1.03)
ALBUMIN SERPL-MCNC: 3.6 G/DL (ref 3.5–5)
ALBUMIN/GLOB SERPL: 1 RATIO (ref 1.1–2)
ALP SERPL-CCNC: 114 UNIT/L (ref 40–150)
ALT SERPL-CCNC: 17 UNIT/L (ref 0–55)
AMPHET UR QL SCN: NEGATIVE
ANION GAP SERPL CALC-SCNC: 10 MEQ/L
AST SERPL-CCNC: 18 UNIT/L (ref 11–45)
BACTERIA #/AREA URNS AUTO: ABNORMAL /HPF
BARBITURATE SCN PRESENT UR: NEGATIVE
BASOPHILS # BLD AUTO: 0.05 X10(3)/MCL
BASOPHILS NFR BLD AUTO: 0.6 %
BENZODIAZ UR QL SCN: NEGATIVE
BILIRUB SERPL-MCNC: 0.4 MG/DL
BILIRUB UR QL STRIP.AUTO: NEGATIVE
BUN SERPL-MCNC: 13.3 MG/DL (ref 9.8–20.1)
CALCIUM SERPL-MCNC: 9.3 MG/DL (ref 8.4–10.2)
CANNABINOIDS UR QL SCN: NEGATIVE
CHLORIDE SERPL-SCNC: 106 MMOL/L (ref 98–107)
CLARITY UR: CLEAR
CO2 SERPL-SCNC: 28 MMOL/L (ref 22–29)
COCAINE UR QL SCN: NEGATIVE
COLOR UR AUTO: ABNORMAL
CREAT SERPL-MCNC: 1.04 MG/DL (ref 0.55–1.02)
CREAT/UREA NIT SERPL: 13
EOSINOPHIL # BLD AUTO: 0.25 X10(3)/MCL (ref 0–0.9)
EOSINOPHIL NFR BLD AUTO: 2.8 %
ERYTHROCYTE [DISTWIDTH] IN BLOOD BY AUTOMATED COUNT: 13.3 % (ref 11.5–17)
FENTANYL UR QL SCN: NEGATIVE
GFR SERPLBLD CREATININE-BSD FMLA CKD-EPI: >60 ML/MIN/1.73/M2
GLOBULIN SER-MCNC: 3.6 GM/DL (ref 2.4–3.5)
GLUCOSE SERPL-MCNC: 123 MG/DL (ref 74–100)
GLUCOSE UR QL STRIP: 500
HCT VFR BLD AUTO: 38.1 % (ref 37–47)
HGB BLD-MCNC: 13.4 G/DL (ref 12–16)
HGB UR QL STRIP: NEGATIVE
IMM GRANULOCYTES # BLD AUTO: 0.04 X10(3)/MCL (ref 0–0.04)
IMM GRANULOCYTES NFR BLD AUTO: 0.4 %
KETONES UR QL STRIP: NEGATIVE
LEUKOCYTE ESTERASE UR QL STRIP: ABNORMAL
LYMPHOCYTES # BLD AUTO: 3.07 X10(3)/MCL (ref 0.6–4.6)
LYMPHOCYTES NFR BLD AUTO: 34 %
MCH RBC QN AUTO: 32.1 PG (ref 27–31)
MCHC RBC AUTO-ENTMCNC: 35.2 G/DL (ref 33–36)
MCV RBC AUTO: 91.4 FL (ref 80–94)
MDMA UR QL SCN: NEGATIVE
MONOCYTES # BLD AUTO: 0.47 X10(3)/MCL (ref 0.1–1.3)
MONOCYTES NFR BLD AUTO: 5.2 %
NEUTROPHILS # BLD AUTO: 5.16 X10(3)/MCL (ref 2.1–9.2)
NEUTROPHILS NFR BLD AUTO: 57 %
NITRITE UR QL STRIP: NEGATIVE
NRBC BLD AUTO-RTO: 0 %
OHS QRS DURATION: 130 MS
OHS QTC CALCULATION: 477 MS
OPIATES UR QL SCN: POSITIVE
PCP UR QL: NEGATIVE
PH UR STRIP: 5.5 [PH]
PH UR: 5.5 [PH] (ref 3–11)
PLATELET # BLD AUTO: 274 X10(3)/MCL (ref 130–400)
PMV BLD AUTO: 10.5 FL (ref 7.4–10.4)
POTASSIUM SERPL-SCNC: 3.9 MMOL/L (ref 3.5–5.1)
PROT SERPL-MCNC: 7.2 GM/DL (ref 6.4–8.3)
PROT UR QL STRIP: NEGATIVE
RBC # BLD AUTO: 4.17 X10(6)/MCL (ref 4.2–5.4)
RBC #/AREA URNS AUTO: ABNORMAL /HPF
SODIUM SERPL-SCNC: 144 MMOL/L (ref 136–145)
SP GR UR STRIP.AUTO: <=1.005 (ref 1–1.03)
SQUAMOUS #/AREA URNS AUTO: ABNORMAL /HPF
TROPONIN I SERPL-MCNC: 0.03 NG/ML (ref 0–0.04)
UROBILINOGEN UR STRIP-ACNC: 0.2
WBC # BLD AUTO: 9.04 X10(3)/MCL (ref 4.5–11.5)
WBC #/AREA URNS AUTO: ABNORMAL /HPF

## 2025-07-15 PROCEDURE — 96360 HYDRATION IV INFUSION INIT: CPT

## 2025-07-15 PROCEDURE — 85025 COMPLETE CBC W/AUTO DIFF WBC: CPT | Performed by: NURSE PRACTITIONER

## 2025-07-15 PROCEDURE — 80053 COMPREHEN METABOLIC PANEL: CPT | Performed by: NURSE PRACTITIONER

## 2025-07-15 PROCEDURE — 93005 ELECTROCARDIOGRAM TRACING: CPT

## 2025-07-15 PROCEDURE — 25000003 PHARM REV CODE 250: Performed by: NURSE PRACTITIONER

## 2025-07-15 PROCEDURE — 93010 ELECTROCARDIOGRAM REPORT: CPT | Mod: ,,, | Performed by: INTERNAL MEDICINE

## 2025-07-15 PROCEDURE — 80307 DRUG TEST PRSMV CHEM ANLYZR: CPT | Performed by: NURSE PRACTITIONER

## 2025-07-15 PROCEDURE — 99285 EMERGENCY DEPT VISIT HI MDM: CPT | Mod: 25

## 2025-07-15 PROCEDURE — 81003 URINALYSIS AUTO W/O SCOPE: CPT | Performed by: NURSE PRACTITIONER

## 2025-07-15 PROCEDURE — 84484 ASSAY OF TROPONIN QUANT: CPT | Performed by: NURSE PRACTITIONER

## 2025-07-15 RX ORDER — OXYCODONE AND ACETAMINOPHEN 10; 325 MG/1; MG/1
1 TABLET ORAL
Refills: 0 | Status: COMPLETED | OUTPATIENT
Start: 2025-07-15 | End: 2025-07-15

## 2025-07-15 RX ORDER — SODIUM CHLORIDE 9 MG/ML
1000 INJECTION, SOLUTION INTRAVENOUS
Status: COMPLETED | OUTPATIENT
Start: 2025-07-15 | End: 2025-07-15

## 2025-07-15 RX ADMIN — SODIUM CHLORIDE 1000 ML: 9 INJECTION, SOLUTION INTRAVENOUS at 05:07

## 2025-07-15 RX ADMIN — OXYCODONE HYDROCHLORIDE AND ACETAMINOPHEN 1 TABLET: 10; 325 TABLET ORAL at 05:07

## 2025-07-15 NOTE — ED PROVIDER NOTES
Encounter Date: 7/15/2025       History     Chief Complaint   Patient presents with    Fatigue     Patient states that prior to arrival she was running errands and going to the pet store.  Patient states that after she shopped at the pet store she felt like she was very tired and fell asleep in her car.  Patient states that she was woken up by the police.  Patient states that she does have sleep apnea and gets tired frequently during the day.  Patient states that she does take multiple doses of oxycodone throughout the day for her chronic back pain.  Patient denies any slurred speech, headache, confusion, weakness, chest pain, shortness of breath, nausea, vomiting, or abdominal pain.  Past medical history of anxiety, depression, diabetes, hypertension, sleep apnea, hysterectomy, cholecystectomy. Patient's  states that patient also had 1 episode of syncope 4 days ago during which she fell and hit her head.  Denies any blurred vision, dizziness or, headache.  Denies any other episode of syncope.        The history is provided by the patient.     Review of patient's allergies indicates:  No Known Allergies  Past Medical History:   Diagnosis Date    Anxiety     Depression     Diabetes     Heart disease     HTN (hypertension)     IBS (irritable bowel syndrome)      Past Surgical History:   Procedure Laterality Date    BACK SURGERY       SECTION      CHOLECYSTECTOMY      COLONOSCOPY N/A 2022    Procedure: COLONOSCOPY;  Surgeon: Hans Roth MD;  Location: [facility] OR;  Service: Endoscopy;  Laterality: N/A;    CORONARY ARTERY BYPASS GRAFT      EXCISION OF CONDYLOMA N/A 2022    Procedure: EXCISION, CONDYLOMA.;  Surgeon: Hans Roth MD;  Location: [facility] OR;  Service: General;  Laterality: N/A;    EXCISIONAL HEMORRHOIDECTOMY N/A 2022    Procedure: HEMORRHOIDECTOMY / PPH;  Surgeon: Hans Roth MD;  Location: [facility] OR;  Service: General;  Laterality: N/A;    HYSTERECTOMY       Family  History   Problem Relation Name Age of Onset    Cancer Mother      No Known Problems Father       Social History[1]  Review of Systems   Constitutional:  Positive for fatigue.   HENT: Negative.     Eyes: Negative.    Respiratory: Negative.     Cardiovascular: Negative.  Negative for chest pain.   Gastrointestinal: Negative.    Endocrine: Negative.    Genitourinary: Negative.    Musculoskeletal: Negative.    Skin: Negative.    Allergic/Immunologic: Negative.    Neurological: Negative.  Negative for dizziness, syncope, speech difficulty, weakness and headaches.   Hematological: Negative.    Psychiatric/Behavioral: Negative.     All other systems reviewed and are negative.      Physical Exam     Initial Vitals [07/15/25 1517]   BP Pulse Resp Temp SpO2   (!) 143/80 70 18 97.7 °F (36.5 °C) 98 %      MAP       --         Physical Exam    Nursing note and vitals reviewed.  Constitutional: She appears well-developed and well-nourished. No distress.   HENT:   Head: Normocephalic and atraumatic. Mouth/Throat: Oropharynx is clear and moist.   Eyes: Conjunctivae and EOM are normal. Pupils are equal, round, and reactive to light.   Neck: Neck supple.   Normal range of motion.  Cardiovascular:  Normal rate, regular rhythm, normal heart sounds and intact distal pulses.           Pulmonary/Chest: Breath sounds normal. No respiratory distress. She has no wheezes.   Abdominal: Abdomen is soft. Bowel sounds are normal. She exhibits no distension. There is no abdominal tenderness.   Musculoskeletal:         General: No tenderness or edema. Normal range of motion.      Cervical back: Normal range of motion and neck supple.     Neurological: She is alert and oriented to person, place, and time. She has normal strength. Gait normal. GCS score is 15. GCS eye subscore is 4. GCS verbal subscore is 5. GCS motor subscore is 6.   Skin: Skin is warm and dry. No rash noted.   Psychiatric: She has a normal mood and affect. Thought content normal.          ED Course   Procedures  Labs Reviewed   COMPREHENSIVE METABOLIC PANEL - Abnormal       Result Value    Sodium 144      Potassium 3.9      Chloride 106      CO2 28      Glucose 123 (*)     Blood Urea Nitrogen 13.3      Creatinine 1.04 (*)     Calcium 9.3      Protein Total 7.2      Albumin 3.6      Globulin 3.6 (*)     Albumin/Globulin Ratio 1.0 (*)     Bilirubin Total 0.4            ALT 17      AST 18      eGFR >60      Anion Gap 10.0      BUN/Creatinine Ratio 13     URINALYSIS, REFLEX TO URINE CULTURE - Abnormal    Color, UA Straw      Appearance, UA Clear      Specific Gravity, UA <=1.005      pH, UA 5.5      Protein, UA Negative      Glucose,  (*)     Ketones, UA Negative      Blood, UA Negative      Bilirubin, UA Negative      Urobilinogen, UA 0.2      Nitrites, UA Negative      Leukocyte Esterase, UA Moderate (*)    DRUG SCREEN, URINE (BEAKER) - Abnormal    Amphetamines, Urine Negative      Barbiturates, Urine Negative      Benzodiazepine, Urine Negative      Cannabinoids, Urine Negative      Cocaine, Urine Negative      Fentanyl, Urine Negative      MDMA, Urine Negative      Opiates, Urine Positive (*)     Phencyclidine, Urine Negative      pH, Urine 5.5      Specific Gravity, Urine Auto <=1.005      Narrative:     Cut off concentrations:    Amphetamines - 1000 ng/ml  Barbiturates - 200 ng/ml  Benzodiazepine - 200 ng/ml  Cannabinoids (THC) - 50 ng/ml  Cocaine - 300 ng/ml  Fentanyl - 1.0 ng/ml  MDMA - 500 ng/ml  Opiates - 300 ng/ml   Phencyclidine (PCP) - 25 ng/ml    Specimen submitted for drug analysis and tested for pH and specific gravity in order to evaluate sample integrity. Suspect tampering if specific gravity is <1.003 and/or pH is not within the range of 4.5 - 8.0  False negatives may result form substances such as bleach added to urine.  False positives may result for the presence of a substance with similar chemical structure to the drug or its metabolite.    This test provides  only a PRELIMINARY analytical test result. A more specific alternate chemical method must be used in order to obtain a confirmed analytical result. Gas chromatography/mass spectrometry (GC/MS) is the preferred confirmatory method. Other chemical confirmation methods are available. Clinical consideration and professional judgement should be applied to any drug of abuse test result, particularly when preliminary positive results are used.    Positive results will be confirmed only at the physicians request. Unconfirmed screening results are to be used only for medical purposes (treatment).        CBC WITH DIFFERENTIAL - Abnormal    WBC 9.04      RBC 4.17 (*)     Hgb 13.4      Hct 38.1      MCV 91.4      MCH 32.1 (*)     MCHC 35.2      RDW 13.3      Platelet 274      MPV 10.5 (*)     Neut % 57.0      Lymph % 34.0      Mono % 5.2      Eos % 2.8      Basophil % 0.6      Imm Grans % 0.4      Neut # 5.16      Lymph # 3.07      Mono # 0.47      Eos # 0.25      Baso # 0.05      Imm Gran # 0.04      NRBC% 0.0     URINALYSIS, MICROSCOPIC - Abnormal    Bacteria, UA Few (*)     RBC, UA None Seen      WBC, UA 3-5      Squamous Epithelial Cells, UA Many (*)    TROPONIN I - Normal    Troponin-I 0.026     CBC W/ AUTO DIFFERENTIAL    Narrative:     The following orders were created for panel order CBC Auto Differential.  Procedure                               Abnormality         Status                     ---------                               -----------         ------                     CBC with Differential[[id number]]       Abnormal            Final result                 Please view results for these tests on the individual orders.     EKG Readings: (Independently Interpreted)   Initial Reading: No STEMI. Previous EKG: Compared with most recent EKG Rhythm: Normal Sinus Rhythm. Heart Rate: 69. Ectopy: No Ectopy. Conduction: RBBB. ST Segments: Normal ST Segments. T Waves: Normal. Axis: Normal. Clinical Impression: Normal Sinus  Rhythm with RBBB     ECG Results              EKG 12-lead (In process)        Collection Time Result Time QRS Duration OHS QTC Calculation    07/15/25 16:59:40 07/15/25 17:02:56 130 477                     In process by Interface, Lab In [facility] (07/15/25 17:03:01)                   Narrative:    Test Reason : R55,    Vent. Rate :  69 BPM     Atrial Rate :  69 BPM     P-R Int : 174 ms          QRS Dur : 130 ms      QT Int : 446 ms       P-R-T Axes :  69  56  62 degrees    QTcB Int : 477 ms    Normal sinus rhythm  Right bundle branch block  Abnormal ECG  When compared with ECG of 29-Oct-2024 14:07,  No significant change was found    Referred By: AAAREFERRAL SELF           Confirmed By:                                   Imaging Results              CT Head Without Contrast (Final result)  Result time 07/15/25 17:53:17      Final result by Hair Babcock MD (07/15/25 17:53:17)                   Impression:      Sinusitis otherwise unremarkable      Electronically signed by: Sam Babcock  Date:    07/15/2025  Time:    17:53               Narrative:    EXAMINATION:  CT HEAD WITHOUT CONTRAST    CLINICAL HISTORY:  Head trauma, moderate-severe;    TECHNIQUE:  Multiple axial images were obtained from the base of the brain to the vertex without contrast administration.  Sagittal and coronal reconstructions were performed. .Automatic exposure control  (AEC) is utilized to reduce patient radiation exposure.    COMPARISON:  None    FINDINGS:  There is no intracranial mass or lesion seen.  No hemorrhage is seen.  No infarct is seen.  The ventricles and basilar cisterns appear normal.  Brain parenchyma appears grossly unremarkable.    Posterior fossa appears normal.  The calvarium is intact.  There is evidence of ethmoid and right maxillary sinusitis.                                       Medications   0.9% NaCl infusion (0 mLs Intravenous Stopped 7/15/25 7474)   oxyCODONE-acetaminophen  mg per tablet 1 tablet (1  tablet Oral Given 7/15/25 1994)     Medical Decision Making  Patient states that prior to arrival she was running errands and going to the pet store.  Patient states that after she shopped at the pet store she felt like she was very tired and fell asleep in her car.  Patient states that she was woken up by the police.  Patient states that she does have sleep apnea and gets tired frequently during the day.  Patient states that she does take multiple doses of oxycodone throughout the day for her chronic back pain.  Patient denies any slurred speech, headache, confusion, weakness, chest pain, shortness of breath, nausea, vomiting, or abdominal pain.  Past medical history of anxiety, depression, diabetes, hypertension, sleep apnea, hysterectomy, cholecystectomy.  Patient's  states that patient also had 1 episode of syncope 4 days ago during which she fell and hit her head.  Denies any blurred vision, dizziness or, headache.  Denies any other episode of syncope.    The history is provided by the patient.       Amount and/or Complexity of Data Reviewed  Labs: ordered. Decision-making details documented in ED Course.  Radiology: ordered. Decision-making details documented in ED Course.  ECG/medicine tests: ordered.  Discussion of management or test interpretation with external provider(s): Differential diagnosis (including but not limited to):   Judging by the patient's chief complaint and pertinent history, the patient has the following possible differential diagnoses, including but not limited to the following.  Some of these are deemed to be lower likelihood and some more likely based on my physical exam and history combined with possible lab work and/or imaging studies.   Please see the pertinent studies, and refer to the HPI.  Some of these diagnoses will take further evaluation to fully rule out, perhaps as an outpatient and the patient was encouraged to follow up when discharged for more comprehensive  evaluation.  Fatigue, dehydration, electrolyte abnormality, syncope  Patient's labs are overall unremarkable.  Patient's UDS was positive for opioids however she does take pain medication daily.  Patient was given a L of IV fluids in the ED.  Patient's CT scan of her head does not show any acute change.  Patient's EKG is normal sinus rhythm with a right bundle branch block and is consistent with all of her previous EKGs.  Patient states that she feels improved after a L of normal saline IV fluids.  Patient states that she does not have any weakness or fatigue at this time and states that she feels improved.  Patient is requesting to be discharged home.  Instructed patient to follow up with her PCP.  ED return precautions were given.      Risk  Prescription drug management.               ED Course as of 07/15/25 1844   Tue Jul 15, 2025   1642 Comprehensive Metabolic Panel(!) [AB]   1643 CBC Auto Differential(!) [AB]   1643 Urinalysis, Reflex to Urine Culture Urine, Clean Catch(!) [AB]   1643 Urinalysis, Microscopic(!) [AB]   1643 Drug Screen, Urine(!) [AB]   1729 Troponin I [AB]   1729 Troponin I: 0.026 [AB]   1757 CT Head Without Contrast  Impression:     Sinusitis otherwise unremarkable   [AB]      ED Course User Index  [AB] Noa Sandy FNP                               Clinical Impression:  Final diagnoses:  [R55] Syncope  [R53.83] Fatigue, unspecified type (Primary)  [E86.0] Dehydration          ED Disposition Condition    Discharge Stable          ED Prescriptions    None       Follow-up Information       Follow up With Specialties Details Why Contact Info    Bridget Boswell FNP Family Medicine, Emergency Medicine In 3 days  [address] [place] [address]  [phone]                     [1]   Social History  Tobacco Use    Smoking status: Former     Types: Cigarettes    Smokeless tobacco: Never   Vaping Use    Vaping status: Never Used   Substance Use Topics    Alcohol use: Not Currently    Drug use:  Never        Vika, Noa BROWN, [facility]  07/15/25 1849

## 2025-07-15 NOTE — ED TRIAGE NOTES
"Here via [facility] after becoming "weak/tired" after driving to local store. Pt felt unsafe to drive and was asleep in car. Chronic back issues. AAOx4. No acute distress  "

## 2025-07-17 ENCOUNTER — HOSPITAL ENCOUNTER (EMERGENCY)
Facility: HOSPITAL | Age: 55
Discharge: HOME | End: 2025-07-17
Attending: INTERNAL MEDICINE
Payer: COMMERCIAL

## 2025-07-17 VITALS
SYSTOLIC BLOOD PRESSURE: 110 MMHG | BODY MASS INDEX: 36.15 KG/M2 | DIASTOLIC BLOOD PRESSURE: 63 MMHG | HEART RATE: 78 BPM | HEIGHT: 65 IN | OXYGEN SATURATION: 99 % | RESPIRATION RATE: 18 BRPM | WEIGHT: 217 LBS | TEMPERATURE: 98 F

## 2025-07-17 DIAGNOSIS — R53.1 GENERALIZED WEAKNESS: ICD-10-CM

## 2025-07-17 DIAGNOSIS — R10.84 GENERALIZED ABDOMINAL PAIN: ICD-10-CM

## 2025-07-17 DIAGNOSIS — R55 SYNCOPE, UNSPECIFIED SYNCOPE TYPE: ICD-10-CM

## 2025-07-17 DIAGNOSIS — K52.9 ENTEROCOLITIS: Primary | ICD-10-CM

## 2025-07-17 DIAGNOSIS — K52.9 GASTROENTERITIS PRESUMED INFECTIOUS: ICD-10-CM

## 2025-07-17 DIAGNOSIS — R11.10 VOMITING: ICD-10-CM

## 2025-07-17 LAB
ALBUMIN SERPL-MCNC: 3.8 G/DL (ref 3.5–5)
ALBUMIN/GLOB SERPL: 1 RATIO (ref 1.1–2)
ALP SERPL-CCNC: 123 UNIT/L (ref 40–150)
ALT SERPL-CCNC: 23 UNIT/L (ref 0–55)
ANION GAP SERPL CALC-SCNC: 14 MEQ/L
AST SERPL-CCNC: 25 UNIT/L (ref 11–45)
BASOPHILS # BLD AUTO: 0.04 X10(3)/MCL
BASOPHILS NFR BLD AUTO: 0.3 %
BILIRUB SERPL-MCNC: 0.5 MG/DL
BNP BLD-MCNC: 24 PG/ML
BUN SERPL-MCNC: 20.3 MG/DL (ref 9.8–20.1)
CALCIUM SERPL-MCNC: 9.6 MG/DL (ref 8.4–10.2)
CHLORIDE SERPL-SCNC: 103 MMOL/L (ref 98–107)
CO2 SERPL-SCNC: 25 MMOL/L (ref 22–29)
CREAT SERPL-MCNC: 1.16 MG/DL (ref 0.55–1.02)
CREAT/UREA NIT SERPL: 18
EOSINOPHIL # BLD AUTO: 0.12 X10(3)/MCL (ref 0–0.9)
EOSINOPHIL NFR BLD AUTO: 0.9 %
ERYTHROCYTE [DISTWIDTH] IN BLOOD BY AUTOMATED COUNT: 13.3 % (ref 11.5–17)
GFR SERPLBLD CREATININE-BSD FMLA CKD-EPI: 55 ML/MIN/1.73/M2
GLOBULIN SER-MCNC: 3.9 GM/DL (ref 2.4–3.5)
GLUCOSE SERPL-MCNC: 175 MG/DL (ref 74–100)
HCT VFR BLD AUTO: 40.9 % (ref 37–47)
HGB BLD-MCNC: 14.5 G/DL (ref 12–16)
IMM GRANULOCYTES # BLD AUTO: 0.05 X10(3)/MCL (ref 0–0.04)
IMM GRANULOCYTES NFR BLD AUTO: 0.4 %
INR PPP: 1
LIPASE SERPL-CCNC: 15 U/L
LYMPHOCYTES # BLD AUTO: 2.04 X10(3)/MCL (ref 0.6–4.6)
LYMPHOCYTES NFR BLD AUTO: 14.8 %
MAGNESIUM SERPL-MCNC: 2 MG/DL (ref 1.6–2.6)
MCH RBC QN AUTO: 32.1 PG (ref 27–31)
MCHC RBC AUTO-ENTMCNC: 35.5 G/DL (ref 33–36)
MCV RBC AUTO: 90.5 FL (ref 80–94)
MONOCYTES # BLD AUTO: 0.42 X10(3)/MCL (ref 0.1–1.3)
MONOCYTES NFR BLD AUTO: 3 %
NEUTROPHILS # BLD AUTO: 11.13 X10(3)/MCL (ref 2.1–9.2)
NEUTROPHILS NFR BLD AUTO: 80.6 %
NRBC BLD AUTO-RTO: 0 %
OHS QRS DURATION: 112 MS
OHS QTC CALCULATION: 485 MS
PLATELET # BLD AUTO: 328 X10(3)/MCL (ref 130–400)
PMV BLD AUTO: 10.9 FL (ref 7.4–10.4)
POCT GLUCOSE: 146 MG/DL (ref 70–110)
POTASSIUM SERPL-SCNC: 3.7 MMOL/L (ref 3.5–5.1)
PROT SERPL-MCNC: 7.7 GM/DL (ref 6.4–8.3)
PROTHROMBIN TIME: 13.8 SECONDS (ref 11.7–14.5)
RBC # BLD AUTO: 4.52 X10(6)/MCL (ref 4.2–5.4)
SODIUM SERPL-SCNC: 142 MMOL/L (ref 136–145)
TROPONIN I SERPL-MCNC: <0.01 NG/ML (ref 0–0.04)
WBC # BLD AUTO: 13.8 X10(3)/MCL (ref 4.5–11.5)

## 2025-07-17 PROCEDURE — 87040 BLOOD CULTURE FOR BACTERIA: CPT | Performed by: INTERNAL MEDICINE

## 2025-07-17 PROCEDURE — 36415 COLL VENOUS BLD VENIPUNCTURE: CPT | Performed by: INTERNAL MEDICINE

## 2025-07-17 PROCEDURE — 93005 ELECTROCARDIOGRAM TRACING: CPT

## 2025-07-17 PROCEDURE — 80053 COMPREHEN METABOLIC PANEL: CPT | Performed by: INTERNAL MEDICINE

## 2025-07-17 PROCEDURE — 84484 ASSAY OF TROPONIN QUANT: CPT | Performed by: INTERNAL MEDICINE

## 2025-07-17 PROCEDURE — 96365 THER/PROPH/DIAG IV INF INIT: CPT

## 2025-07-17 PROCEDURE — 85610 PROTHROMBIN TIME: CPT | Performed by: INTERNAL MEDICINE

## 2025-07-17 PROCEDURE — 99285 EMERGENCY DEPT VISIT HI MDM: CPT | Mod: 25

## 2025-07-17 PROCEDURE — 63600175 PHARM REV CODE 636 W HCPCS: Performed by: INTERNAL MEDICINE

## 2025-07-17 PROCEDURE — 96361 HYDRATE IV INFUSION ADD-ON: CPT

## 2025-07-17 PROCEDURE — 25500020 PHARM REV CODE 255: Performed by: INTERNAL MEDICINE

## 2025-07-17 PROCEDURE — 83690 ASSAY OF LIPASE: CPT | Performed by: INTERNAL MEDICINE

## 2025-07-17 PROCEDURE — 85025 COMPLETE CBC W/AUTO DIFF WBC: CPT | Performed by: INTERNAL MEDICINE

## 2025-07-17 PROCEDURE — 25000003 PHARM REV CODE 250: Performed by: INTERNAL MEDICINE

## 2025-07-17 PROCEDURE — 83880 ASSAY OF NATRIURETIC PEPTIDE: CPT | Performed by: INTERNAL MEDICINE

## 2025-07-17 PROCEDURE — 93010 ELECTROCARDIOGRAM REPORT: CPT | Mod: ,,, | Performed by: INTERNAL MEDICINE

## 2025-07-17 PROCEDURE — 83735 ASSAY OF MAGNESIUM: CPT | Performed by: INTERNAL MEDICINE

## 2025-07-17 RX ORDER — CIPROFLOXACIN 500 MG/1
500 TABLET, FILM COATED ORAL EVERY 12 HOURS
Qty: 20 TABLET | Refills: 0 | Status: SHIPPED | OUTPATIENT
Start: 2025-07-17 | End: 2025-07-27

## 2025-07-17 RX ORDER — METRONIDAZOLE 500 MG/1
500 TABLET ORAL 3 TIMES DAILY
Qty: 30 TABLET | Refills: 0 | Status: SHIPPED | OUTPATIENT
Start: 2025-07-17 | End: 2025-07-27

## 2025-07-17 RX ORDER — CIPROFLOXACIN 2 MG/ML
400 INJECTION, SOLUTION INTRAVENOUS
Status: COMPLETED | OUTPATIENT
Start: 2025-07-17 | End: 2025-07-17

## 2025-07-17 RX ORDER — ONDANSETRON 4 MG/1
4 TABLET, ORALLY DISINTEGRATING ORAL EVERY 6 HOURS PRN
Qty: 15 TABLET | Refills: 0 | Status: SHIPPED | OUTPATIENT
Start: 2025-07-17

## 2025-07-17 RX ADMIN — SODIUM CHLORIDE 1000 ML: 9 INJECTION, SOLUTION INTRAVENOUS at 12:07

## 2025-07-17 RX ADMIN — IOHEXOL 100 ML: 350 INJECTION, SOLUTION INTRAVENOUS at 01:07

## 2025-07-17 RX ADMIN — CIPROFLOXACIN 400 MG: 2 INJECTION, SOLUTION INTRAVENOUS at 02:07

## 2025-07-17 NOTE — DISCHARGE INSTRUCTIONS
Hold BP medications until symptoms resolve. Monitor BP with home monitor. Resume as instructed by PCP or otherwise appropriate

## 2025-07-17 NOTE — ED PROVIDER NOTES
Source of History:  Patient, significant other, no limitations    Chief complaint:  Loss of Consciousness and Vomiting (Pt having multiple syncopal episodes starting 2 days ago. Pt also having n/v)      HPI:  Kathi Cleary is a 54 y.o. female presenting with Loss of Consciousness and Vomiting (Pt having multiple syncopal episodes starting 2 days ago. Pt also having n/v)       Multiple complaints, N/V, generalized weakness, multiple syncopal episodes with head injury, involuntary jerking movements, LLQ abd pain, right flank pain. Onset 2 days ago. Seen in this ED. CT head negative, diagnosed with syncope, fatigue, dehydration. Symptoms progressed. Reports taking meds as prescribed and not overtaking them. Hx of chronic pain s/p spinal stimulator, IBS, HTN, DM, depression/anxiety.     Patient complains of syncope, vomiting, and weakness. Onset was 2 days ago, with worsening course since that time. Patient was on way to bathroom for most recent syncopal episode. The duration of the episode Constant and continues with generalized weakness. Symptoms are exacerbated by exertion and standing. Symptoms are relieved by rest. Associated symptoms include nausea, vomiting, and generalized weakness.        Review of Systems   Constitutional symptoms:  Negative except as documented in HPI.   Skin symptoms:  Negative except as documented in HPI.   HEENT symptoms:  Negative except as documented in HPI.   Respiratory symptoms:  Negative except as documented in HPI.   Cardiovascular symptoms:  Negative except as documented in HPI.   Gastrointestinal symptoms:  Negative except as documented in HPI.    Genitourinary symptoms:  Negative except as documented in HPI.   Musculoskeletal symptoms:  Negative except as documented in HPI.   Neurologic symptoms:  Negative except as documented in HPI.   Psychiatric symptoms:  Negative except as documented in HPI.   Allergy/immunologic symptoms:  Negative except as documented in HPI.            "  Additional review of systems information: All other systems reviewed and otherwise negative.      Review of patient's allergies indicates:  No Known Allergies    PMH:  As per HPI and below:    Past Medical History:   Diagnosis Date    Anxiety     Depression     Diabetes     Heart disease     HTN (hypertension)     IBS (irritable bowel syndrome)        Family History   Problem Relation Name Age of Onset    Cancer Mother      No Known Problems Father         Past Surgical History:   Procedure Laterality Date    BACK SURGERY       SECTION      CHOLECYSTECTOMY      COLONOSCOPY N/A 2022    Procedure: COLONOSCOPY;  Surgeon: Hans Roth MD;  Location: [facility] OR;  Service: Endoscopy;  Laterality: N/A;    CORONARY ARTERY BYPASS GRAFT      EXCISION OF CONDYLOMA N/A 2022    Procedure: EXCISION, CONDYLOMA.;  Surgeon: Hans Roth MD;  Location: [facility] OR;  Service: General;  Laterality: N/A;    EXCISIONAL HEMORRHOIDECTOMY N/A 2022    Procedure: HEMORRHOIDECTOMY / PPH;  Surgeon: Hans Roth MD;  Location: [facility] OR;  Service: General;  Laterality: N/A;    HYSTERECTOMY         Social History[1]    Problem List[2]     Physical Exam:    BP (!) 103/48   Pulse 93   Temp 98.4 °F (36.9 °C) (Oral)   Resp 16   Ht 5' 5" (1.651 m)   Wt 98.4 kg (217 lb)   SpO2 98%   BMI 36.11 kg/m²     Nursing note and vital signs reviewed.    General:  Alert, anxious, disheveled  Skin: Normal for Ethnic Origin, No cyanosis, mild intertrigo in skin folds   HEENT: Normocephalic and atraumatic, Vision unchanged,   Cardiovascular:  Regular rate and rhythm  Chest Wall: No deformity, equal chest rise  Respiratory:  Lungs are clear to auscultation, respirations are non-labored.    Musculoskeletal:  No deformity, Normal perfusion to all extremities  Gastrointestinal:  Soft, Non distended, obese  Neurological:  Alert and oriented, normal motor observed, normal speech observed.    Psychiatric:  Cooperative, anxious mood & " affect.        Labs that have been ordered have been independently reviewed and interpreted by myself.     Old Chart Reviewed.      Initial Impression/ Differential Dx:  Arrhythmia, situational, vasovagal, cerebrovascular, neurogenic, psychogenic, drug-induced, autonomic failure, dehydration.   Obstructive cardiomyopathy, structural cardiac disease, aortic dissection, PE, pulmonary hypertension, carotid sinus syndrome      Electrolyte abnormality, hypoglycemia, infectious causes, endocrine abnormalities, medication side effect, dehydration, anemia  CVA, spinal cord abnormality, Guillain [place], neuromuscular junction disease, muscle disease      MDM:      Reviewed Nurses Note.    Reviewed Pertinent old records.    Orders Placed This Encounter    Pulse Oximeter    Blood Culture #1 **CANNOT BE ORDERED STAT**    Blood Culture #2 **CANNOT BE ORDERED STAT**    X-Ray Chest 1 View    CT Head Without Contrast    CT Chest Abdomen Pelvis With IV Contrast (XPD) NO Oral Contrast    Urinalysis, Reflex to Urine Culture Urine, Clean Catch    CBC Auto Differential    Comprehensive Metabolic Panel    Protime-INR    Lipase    BNP    Troponin I    Magnesium    CBC with Differential    Cardiac Monitoring - Adult    EKG 12-lead    Insert peripheral IV    sodium chloride 0.9% bolus 1,000 mL 1,000 mL    iohexoL (OMNIPAQUE 350) injection 100 mL    ciprofloxacin (CIPRO)400mg/200ml D5W IVPB 400 mg    ciprofloxacin HCl (CIPRO) 500 MG tablet    metroNIDAZOLE (FLAGYL) 500 MG tablet    ondansetron (ZOFRAN-ODT) 4 MG TbDL                    Labs Reviewed   COMPREHENSIVE METABOLIC PANEL - Abnormal       Result Value    Sodium 142      Potassium 3.7      Chloride 103      CO2 25      Glucose 175 (*)     Blood Urea Nitrogen 20.3 (*)     Creatinine 1.16 (*)     Calcium 9.6      Protein Total 7.7      Albumin 3.8      Globulin 3.9 (*)     Albumin/Globulin Ratio 1.0 (*)     Bilirubin Total 0.5            ALT 23      AST 25      eGFR 55      Anion  Gap 14.0      BUN/Creatinine Ratio 18     CBC WITH DIFFERENTIAL - Abnormal    WBC 13.80 (*)     RBC 4.52      Hgb 14.5      Hct 40.9      MCV 90.5      MCH 32.1 (*)     MCHC 35.5      RDW 13.3      Platelet 328      MPV 10.9 (*)     Neut % 80.6      Lymph % 14.8      Mono % 3.0      Eos % 0.9      Basophil % 0.3      Imm Grans % 0.4      Neut # 11.13 (*)     Lymph # 2.04      Mono # 0.42      Eos # 0.12      Baso # 0.04      Imm Gran # 0.05 (*)     NRBC% 0.0     PROTIME-INR - Normal    PT 13.8      INR 1.0      Narrative:     Protimes are used to monitor anticoagulant agents such as warfarin. PT INR values are based on the current patient normal mean and the TANISHA value for the specific instrument reagent used.  **Routine theraputic target values for the INR are 2.0-3.0**   LIPASE - Normal    Lipase Level 15     B-TYPE NATRIURETIC PEPTIDE - Normal    Natriuretic Peptide 24.0     TROPONIN I - Normal    Troponin-I <0.010     MAGNESIUM - Normal    Magnesium Level 2.00     BLOOD CULTURE [facility]   BLOOD CULTURE [facility]   CBC W/ AUTO DIFFERENTIAL    Narrative:     The following orders were created for panel order CBC Auto Differential.  Procedure                               Abnormality         Status                     ---------                               -----------         ------                     CBC with Differential[[id number]]       Abnormal            Final result                 Please view results for these tests on the individual orders.   URINALYSIS, REFLEX TO URINE CULTURE          CT Chest Abdomen Pelvis With IV Contrast (XPD) NO Oral Contrast         X-Ray Chest 1 View    (Results Pending)   CT Head Without Contrast    (Results Pending)        Admission on 07/17/2025   Component Date Value Ref Range Status    Sodium 07/17/2025 142  136 - 145 mmol/L Final    Potassium 07/17/2025 3.7  3.5 - 5.1 mmol/L Final    Chloride 07/17/2025 103  98 - 107 mmol/L Final    CO2 07/17/2025 25  22 - 29 mmol/L Final    Glucose  07/17/2025 175 (H)  74 - 100 mg/dL Final    Blood Urea Nitrogen 07/17/2025 20.3 (H)  9.8 - 20.1 mg/dL Final    Creatinine 07/17/2025 1.16 (H)  0.55 - 1.02 mg/dL Final    Calcium 07/17/2025 9.6  8.4 - 10.2 mg/dL Final    Protein Total 07/17/2025 7.7  6.4 - 8.3 gm/dL Final    Albumin 07/17/2025 3.8  3.5 - 5.0 g/dL Final    Globulin 07/17/2025 3.9 (H)  2.4 - 3.5 gm/dL Final    Albumin/Globulin Ratio 07/17/2025 1.0 (L)  1.1 - 2.0 ratio Final    Bilirubin Total 07/17/2025 0.5  <=1.5 mg/dL Final    ALP 07/17/2025 123  40 - 150 unit/L Final    ALT 07/17/2025 23  0 - 55 unit/L Final    AST 07/17/2025 25  11 - 45 unit/L Final    eGFR 07/17/2025 55  mL/min/1.73/m2 Final    Anion Gap 07/17/2025 14.0  mEq/L Final    BUN/Creatinine Ratio 07/17/2025 18   Final    PT 07/17/2025 13.8  11.7 - 14.5 seconds Final    INR 07/17/2025 1.0  <=1.3 Final    Lipase Level 07/17/2025 15  <=60 U/L Final    Natriuretic Peptide 07/17/2025 24.0  <=100.0 pg/mL Final    Troponin-I 07/17/2025 <0.010  0.000 - 0.045 ng/mL Final    Magnesium Level 07/17/2025 2.00  1.60 - 2.60 mg/dL Final    WBC 07/17/2025 13.80 (H)  4.50 - 11.50 x10(3)/mcL Final    RBC 07/17/2025 4.52  4.20 - 5.40 x10(6)/mcL Final    Hgb 07/17/2025 14.5  12.0 - 16.0 g/dL Final    Hct 07/17/2025 40.9  37.0 - 47.0 % Final    MCV 07/17/2025 90.5  80.0 - 94.0 fL Final    MCH 07/17/2025 32.1 (H)  27.0 - 31.0 pg Final    MCHC 07/17/2025 35.5  33.0 - 36.0 g/dL Final    RDW 07/17/2025 13.3  11.5 - 17.0 % Final    Platelet 07/17/2025 328  130 - 400 x10(3)/mcL Final    MPV 07/17/2025 10.9 (H)  7.4 - 10.4 fL Final    Neut % 07/17/2025 80.6  % Final    Lymph % 07/17/2025 14.8  % Final    Mono % 07/17/2025 3.0  % Final    Eos % 07/17/2025 0.9  % Final    Basophil % 07/17/2025 0.3  % Final    Imm Grans % 07/17/2025 0.4  % Final    Neut # 07/17/2025 11.13 (H)  2.1 - 9.2 x10(3)/mcL Final    Lymph # 07/17/2025 2.04  0.6 - 4.6 x10(3)/mcL Final    Mono # 07/17/2025 0.42  0.1 - 1.3 x10(3)/mcL Final    Eos #  07/17/2025 0.12  0 - 0.9 x10(3)/mcL Final    Baso # 07/17/2025 0.04  <=0.2 x10(3)/mcL Final    Imm Gran # 07/17/2025 0.05 (H)  0.00 - 0.04 x10(3)/mcL Final    NRBC% 07/17/2025 0.0  % Final       Imaging Results              CT Chest Abdomen Pelvis With IV Contrast (XPD) NO Oral Contrast (Preliminary result)  Result time 07/17/25 01:57:32      Preliminary result by Bharat Diamond MD (07/17/25 01:57:32)                   Narrative:    START OF REPORT:  Technique: CT Scan of the chest abdomen and pelvis was performed with intravenous contrast with axial as well as sagittal and, coronal images.    Dosage Information: Automated Exposure Control was utilized.    Comparison: None.    Clinical History: Multiple complaints, N/V, generalized weakness, multiple syncopal episodes with head injury, involuntary jerking movements, LLQ abd pain, right flank pain. Onset 2 days ago. Seen in this ED. CT head negative, diagnosed with syncope, fatigue, dehydration. Symptoms progressed. Reports taking meds as prescribed and not overtaking them. Hx of chronic pain s/p spinal stimulator, IBS, HTN, DM, depression/anxiety. Patient complains of syncope, vomiting, and weakness.    Findings:  Soft Tissues: Unremarkable.  Lines and Tubes: None.  Neck: The visualized soft tissues of the neck appear unremarkable. A small hypodense nodule is seen in the right thyroid lobe (series 2, Image 1). Sonographic correlation may be done for further evaluation.  Mediastinum: The mediastinal structures are within normal limits.  Heart: The heart size is within normal limits. Sternotomy wires are noted.  Aorta: Mild aortic calcification is seen in the ascending arch and descending thoracic aorta. Mild tortuousity is seen involving the ascending and descending thoracic aorta. A surgical clip is seen in the ascending aorta (Series 2, IMage 31).  Pulmonary Arteries: No filling defects are seen in the pulmonary arteries to suggest pulmonary embolus.  Lungs:  There is mild non specific dependent change at the lung bases. A subcentimeter subpleural nodule is seen in the superior segment of the left lower lobe (series 4, Image 48). No acute focal infiltrate or consolidation is identified.  Pleura: No effusions or pneumothorax are identified.  Bony Structures:  Spine: Moderate multilevel spondylolytic changes are seen in the thoracic spine.  Ribs: The bilateral ribs appear unremarkable.  Abdomen:  Abdominal Wall: There is a small to medium sized uncomplicated umbilical hernia which contains mesenteric fat. The abdominal wall otherwise appears unremarkable.  Liver: The liver appears unremarkable.  Biliary System: The intra and extra hepatic biliary system appears prominent which may reflect prior obstructive physiology in this patient status post cholecystectomy.  Gallbladder: The gallbladder is not visualized which may reflect prior cholecystectomy.  Pancreas: Mild pancreatic atrophy is seen.  Spleen: The spleen appears unremarkable. A small 9.7 mm diameter splenule is seen on Image 328, Series 18.  Adrenals: The adrenal glands appear unremarkable.  Kidneys: A single cyst measuring 5.8 mm is seen on Image 384, Series 18 in the of the left kidney. The left kidney otherwise appears unremarkable with no stones masses or hydronephrosis identified. A single cyst measuring 14.2 mm is seen on Image 505, Series 18 in the lower pole of the right kidney. The right kidney otherwise appears unremarkable with no stones masses or hydronephrosis identified.  Aorta: There is mild scattered calcification of the abdominal aorta and its branches.  IVC: Unremarkable.  Bowel: There are numerous loops of nondistended fluid-filled small bowel with prominent mucosal enhancement with similar prominent but nondistended fluid-filled colon with mucosal enhancement.  Esophagus: The visualized esophagus appears unremarkable.  Stomach: The stomach appears unremarkable.  Duodenum: Unremarkable appearing  duodenum.  Appendix: The appendix appears unremarkable and is seen on Image 621, Series 18 through Image 635, Series 18.  Peritoneum: No intraperitoneal free air or ascites is seen.    Pelvis:  Bladder: The bladder appears unremarkable.  Female:  Uterus: The uterus is atrophic.  Ovaries: The ovaries are not identified. No adnexal masses are seen.    Bony structures:  Dorsal Spine: There is pronounced multilevel spondylosis of the visualized dorsal spine. Posterior spinal fixation hardware is seen overlying L2-S1. A neurostimulator device is noted in the left lumbar region.  Bony Pelvis: There is mild degenerative change of the bilateral hip. The visualized bony structures of the pelvis otherwise appear unremarkable.      Impression:  1. No acute focal infiltrate or consolidation is identified.  2. No filling defects are seen in the pulmonary arteries to suggest pulmonary embolus.  3. There are numerous loops of nondistended fluid-filled small bowel with prominent mucosal enhancement with similar prominent but nondistended fluid-filled colon with mucosal enhancement. These findings could reflect entero colitis. Correlate with clinical and laboratory findings.  4. No CT evidence of pulmonary embolism or other acute intrathoracic pathology. Details and other findings as discussed above.                                         CT Head Without Contrast (In process)                      X-Ray Chest 1 View (In process)                       ECG Results              EKG 12-lead (Preliminary result)  Result time 07/17/25 00:38:57      Wet Read by Per Dumont DO (07/17/25 00:38:57, [facility] - Emergency Dept, Emergency Medicine)    Independent ECG Interpretation:    NSR at rate of 97. Normal intervals. Normal QRS. Qtc of 485 ms. Nonspecific ST or T wave abnormalities. Overall impression: Abnormal                                                ED Course as of 07/17/25 0220   Thu Jul 17, 2025    0208 WBC(!): 13.80 [MP]   0208 Glucose(!): 175 [MP]   0208 BUN(!): 20.3 [MP]   0208 Creatinine(!): 1.16 [MP]   0208 Lipase: 15 [MP]   0208 Magnesium : 2.00 [MP]   0208 BNP: 24.0 [MP]   0208 Troponin I: <0.010 [MP]      ED Course User Index  [MP] Per Dumont DO                        Diagnostic Impression:    1. Enterocolitis    2. Vomiting    3. Generalized weakness    4. Gastroenteritis presumed infectious    5. Syncope, unspecified syncope type    6. Generalized abdominal pain         ED Disposition Condition    Discharge Stable             Follow-up Information       [facility] - Emergency Dept.    Specialty: Emergency Medicine  Why: If symptoms worsen  Contact information:  [address] [facility] [address] [phone]             Schedule an appointment as soon as possible for a visit  with Bridget Boswell FNP.    Specialties: Family Medicine, Emergency Medicine  Contact information:  [address] [place] [address] [phone]                              ED Prescriptions       Medication Sig Dispense Start Date End Date Auth. Provider    ciprofloxacin HCl (CIPRO) 500 MG tablet Take 1 tablet (500 mg total) by mouth every 12 (twelve) hours. for 10 days 20 tablet 7/17/2025 7/27/2025 Per Dumont DO    metroNIDAZOLE (FLAGYL) 500 MG tablet Take 1 tablet (500 mg total) by mouth 3 (three) times daily. for 10 days 30 tablet 7/17/2025 7/27/2025 Per Dumont DO    ondansetron (ZOFRAN-ODT) 4 MG TbDL Take 1 tablet (4 mg total) by mouth every 6 (six) hours as needed (Nausea). 15 tablet 7/17/2025 -- Per Dumont DO          Follow-up Information       Follow up With Specialties Details Why Contact Info    [facility] - Emergency Dept Emergency Medicine  If symptoms worsen [address] [facility] [address] [phone]    Bridget Boswell FNP Family Medicine, Emergency Medicine  Schedule an appointment as soon as possible for a visit   [address]  [place] [address]  [phone]                   [1]   Social History  Tobacco Use    Smoking status: Former     Types: Cigarettes    Smokeless tobacco: Never   Vaping Use    Vaping status: Never Used   Substance Use Topics    Alcohol use: Not Currently    Drug use: Never   [2]   Patient Active Problem List  Diagnosis    Encounter for colorectal cancer screening    Colon cancer screening    Hemorrhoids        Per Dumont, DO  07/17/25 0222

## 2025-07-22 LAB
BACTERIA BLD CULT: NORMAL
BACTERIA BLD CULT: NORMAL

## 2025-08-25 ENCOUNTER — HOSPITAL ENCOUNTER (OUTPATIENT)
Dept: RADIOLOGY | Facility: HOSPITAL | Age: 55
Discharge: HOME | End: 2025-08-25
Attending: NURSE PRACTITIONER
Payer: COMMERCIAL

## 2025-08-25 DIAGNOSIS — Z12.31 ENCOUNTER FOR SCREENING MAMMOGRAM FOR BREAST CANCER: ICD-10-CM

## 2025-08-25 PROCEDURE — 77063 BREAST TOMOSYNTHESIS BI: CPT | Mod: TC

## 2025-08-25 PROCEDURE — 77067 SCR MAMMO BI INCL CAD: CPT | Mod: 26,,, | Performed by: RADIOLOGY

## 2025-08-25 PROCEDURE — 77063 BREAST TOMOSYNTHESIS BI: CPT | Mod: 26,,, | Performed by: RADIOLOGY

## (undated) DEVICE — PAD ELECTROSURGICAL SPL W/CORD

## (undated) DEVICE — KIT SURGICAL COLON .25 1.1OZ

## (undated) DEVICE — HEMOSTAT SURGICEL 4X8IN

## (undated) DEVICE — SOL IRR SOD CHL .9% POUR

## (undated) DEVICE — SUT PROLENE 2-0 30 SH

## (undated) DEVICE — SNARE POLYP STD SNG 7FR

## (undated) DEVICE — SUT PLN GUT 2-0 CT-3 1X27

## (undated) DEVICE — GLOVE PROTEXIS BLUE LATEX 7

## (undated) DEVICE — BLANKET SNUGGLE WARM ADLT SM

## (undated) DEVICE — SYR 10CC LUER LOCK

## (undated) DEVICE — STAPLER HEMORRHOID 33MM STERIL

## (undated) DEVICE — HEMOSTAT SURGICEL FIBRLR 2X4IN

## (undated) DEVICE — GLOVE PROTEXIS PF LATEX 7.0

## (undated) DEVICE — KIT SURGICAL TURNOVER

## (undated) DEVICE — Device

## (undated) DEVICE — TRAY DRY SKIN SCRUB PREP

## (undated) DEVICE — SUT CTD VICRYL VIL BR CR/SH

## (undated) DEVICE — GLOVE PROTEXIS LTX 6.5

## (undated) DEVICE — SUPPORT ULNA NERVE PROTECTOR

## (undated) DEVICE — SPONGE SURGIFOAM 100 8.5X12X10

## (undated) DEVICE — BENZOIN TINCTURE 0.66ML

## (undated) DEVICE — GAUZE SPONGE 4X4 12PLY

## (undated) DEVICE — BLADE CLIPPER SURGICAL GRAY

## (undated) DEVICE — TAPE MEDIPORE 3 X 10YD

## (undated) DEVICE — NDL SAFETY 22G X 1.5 ECLIPSE